# Patient Record
Sex: FEMALE | Race: WHITE | Employment: FULL TIME | URBAN - METROPOLITAN AREA
[De-identification: names, ages, dates, MRNs, and addresses within clinical notes are randomized per-mention and may not be internally consistent; named-entity substitution may affect disease eponyms.]

---

## 2020-09-29 ENCOUNTER — HOSPITAL ENCOUNTER (OUTPATIENT)
Age: 25
Setting detail: SPECIMEN
Discharge: HOME | End: 2020-09-29
Payer: COMMERCIAL

## 2020-09-29 ENCOUNTER — OFFICE VISIT (OUTPATIENT)
Dept: OBGYN CLINIC | Age: 25
End: 2020-09-29
Payer: COMMERCIAL

## 2020-09-29 ENCOUNTER — TELEPHONE (OUTPATIENT)
Dept: OBGYN CLINIC | Age: 25
End: 2020-09-29

## 2020-09-29 VITALS
WEIGHT: 130 LBS | DIASTOLIC BLOOD PRESSURE: 74 MMHG | SYSTOLIC BLOOD PRESSURE: 118 MMHG | HEIGHT: 63 IN | BODY MASS INDEX: 23.04 KG/M2

## 2020-09-29 PROCEDURE — 99395 PREV VISIT EST AGE 18-39: CPT | Performed by: NURSE PRACTITIONER

## 2020-09-29 RX ORDER — LEVONORGESTREL AND ETHINYL ESTRADIOL 0.1-0.02MG
1 KIT ORAL DAILY
COMMUNITY
End: 2021-03-17

## 2020-09-29 RX ORDER — CITALOPRAM 10 MG/1
TABLET ORAL
COMMUNITY
Start: 2020-08-27 | End: 2021-03-17

## 2020-09-29 SDOH — HEALTH STABILITY: MENTAL HEALTH: HOW OFTEN DO YOU HAVE A DRINK CONTAINING ALCOHOL?: 2-4 TIMES A MONTH

## 2020-09-29 NOTE — PROGRESS NOTES
YEARLY PHYSICAL    Date of service: 2020    Osiel Naidu  Is a 31 y.o.  female    PT's PCP is: [id number] [facility]     : 1995                                             Subjective:       Patient's last menstrual period was 2020. Are your menses regular: yes    OB History    Para Term  AB Living   0 0 0 0 0 0   SAB TAB Ectopic Molar Multiple Live Births   0 0 0 0 0 0        Social History     Tobacco Use   Smoking Status Never Smoker   Smokeless Tobacco Never Used        Social History     Substance and Sexual Activity   Alcohol Use Yes    Frequency: 2-4 times a month    Comment: social, wine       Family History   Problem Relation Age of Onset    Rheum Arthritis Mother     Rheum Arthritis Maternal Grandmother        Any family history of breast or ovarian cancer: No    Any family history of blood clots: No      Allergies: Patient has no known allergies. Current Outpatient Medications:     citalopram (CELEXA) 10 MG tablet, TAKE 1 TABLET BY MOUTH ONCE DAILY, Disp: , Rfl:     levonorgestrel-ethinyl estradiol (VIENVA) 0.1-20 MG-MCG per tablet, Take 1 tablet by mouth daily, Disp: , Rfl:     Social History     Substance and Sexual Activity   Sexual Activity Yes    Partners: Male    Birth control/protection: Pill       Last Yearly:  2017    Last pap: 2016    Last HPV: N/A    Do you do self breast exams: Yes    Past Medical History:   Diagnosis Date    Infertility, female     Syncope        Past Surgical History:   Procedure Laterality Date    LYMPH NODE BIOPSY      lymph node gland removal    NASAL FRACTURE SURGERY         Family History   Problem Relation Age of Onset    Rheum Arthritis Mother     Rheum Arthritis Maternal Grandmother        Chief Complaint   Patient presents with    Gynecologic Exam     Due for pap. cramping with menses. Discuss bc options. Forgets to take OCPs.            PE: Vital Signs  Blood pressure 118/74, height 5' 3\" (1.6 m), weight 130 lb (59 kg), last menstrual period 09/20/2020. Estimated body mass index is 23.03 kg/m² as calculated from the following:    Height as of this encounter: 5' 3\" (1.6 m). Weight as of this encounter: 130 lb (59 kg). HPI: Patient presents today for annual exam. Feeling well. Denies breast/pelvic pain. Pap is due today, desires STD screening. Currently taking OC's and not satisfied due to forgetting to take them daily. Would like to discuss alternative options. Review of Systems   Genitourinary: Positive for menstrual problem (cramping ). All other systems reviewed and are negative. Objective  Lymphatic:   no lymphadenopathy  Heent:   negative   Cor: regular rate and rhythm, no murmurs              Pul:clear to auscultation bilaterally- no wheezes, rales or rhonchi, normal air movement, no respiratory distress      GI: Abdomen soft, non-tender. BS normal. No masses,  No organomegaly           Extremities: normal strength, tone, and muscle mass   Breasts: Breast:normal appearance, no masses or tenderness, No nipple retraction or dimpling, No nipple discharge or bleeding   Pelvic Exam: GENITAL/URINARY:  External Genitalia:  General appearance; normal, Hair distribution; normal, Lesions absent  Urethral Meatus:  Size normal, Location normal, Lesions absent, Prolapse absent  Urethra: Fullness absent, Masses absent  Bladder:  Fullness absent, Masses absent, Tenderness absent, Cystocele absent  Vagina:  General appearance normal, Estrogen effect normal, Discharge absent, Lesions absent, Pelvic support normal  Cervix:  General appearance normal, Lesions absent, Discharge absent, Tenderness absent, Enlargement absent, Nodularity absent  Uterus:  Size normal, Tenderness absent  Adenexa:   Masses absent, Tenderness absent  Anus/Perineum:  Lesions absent and Masses absent                                    Vaginal discharge: no vaginal discharge                             Assessment and Plan          Diagnosis Orders   1. Women's annual routine gynecological examination  PAP SMEAR    C.trachomatis N.gonorrhoeae DNA, Thin Prep   2. Dysmenorrhea         Discussed options available for hormonal cycle control including OC's, Depo, Nexplanon, patch, NuvaRing, IUD. Denies risk factor for use of any method. Patient would like to proceed with Nexplanon. Reviewed risks/benefits, procedure, side effects, bleeding patterns, ACHES-pt verbalizes understanding       I have discontinued Peyton Graham's Desogestrel-Ethinyl Estradiol (EMOQUETTE PO). I am also having her maintain her citalopram and levonorgestrel-ethinyl estradiol. Return in about 1 year (around 9/29/2021) for yearly. She was also counseled on her preventative health maintenance recommendations and follow-up. There are no Patient Instructions on file for this visit.     Tyron Corrales,9/29/2020 11:33 AM

## 2020-10-01 NOTE — TELEPHONE ENCOUNTER
[facility]ef[facility]

## 2020-10-02 LAB
CHLAMYDIA BY THIN PREP: NEGATIVE
N. GONORRHOEAE DNA, THIN PREP: NEGATIVE
SPECIMEN DESCRIPTION: NORMAL

## 2020-10-08 LAB — CYTOLOGY REPORT: NORMAL

## 2020-10-15 NOTE — TELEPHONE ENCOUNTER
Voicemail received from pt returning my call. I called her back and explained coverage. Pt. Voiced that she wants to proceed with the order through the specialty pharm.

## 2020-10-15 NOTE — TELEPHONE ENCOUNTER
I called nexplanon to check on status of this. They told me device and insert both covered at 100%. I called pt and l/m informing of this and that the device needs to be ordered through specialty pharm. I will not place the order until she calls back and confirms that she wants to proceed.

## 2020-11-09 NOTE — TELEPHONE ENCOUNTER
Spoke with [facility] to check status of pts nexplanon device. They did not have any insurance info on file for this pt. Insurance info given to pharmacy rep and they will process.

## 2020-11-12 NOTE — TELEPHONE ENCOUNTER
Pt notified that her Nexplanon device is here in the office. Pt to call on cycle day 1 to schedule the insertion for cycle day 5-9. Pt thinks her cycle is supposed to start today so she will call when it has started. She is aware of need for SPT the day prior to insertion. Order in computer.

## 2020-11-17 ENCOUNTER — HOSPITAL ENCOUNTER (OUTPATIENT)
Age: 25
Setting detail: SPECIMEN
Discharge: HOME | End: 2020-11-17
Payer: COMMERCIAL

## 2020-11-17 LAB — HCG QUANTITATIVE: <1 IU/L

## 2020-11-18 ENCOUNTER — OFFICE VISIT (OUTPATIENT)
Dept: OBGYN CLINIC | Age: 25
End: 2020-11-18
Payer: COMMERCIAL

## 2020-11-18 VITALS
SYSTOLIC BLOOD PRESSURE: 100 MMHG | DIASTOLIC BLOOD PRESSURE: 62 MMHG | WEIGHT: 129 LBS | HEIGHT: 63 IN | BODY MASS INDEX: 22.86 KG/M2

## 2020-11-18 PROCEDURE — 11981 INSERTION DRUG DLVR IMPLANT: CPT | Performed by: ADVANCED PRACTICE MIDWIFE

## 2020-11-18 RX ORDER — ETONOGESTREL 68 MG/1
IMPLANT SUBCUTANEOUS
COMMUNITY
Start: 2020-11-18

## 2020-11-18 NOTE — PROGRESS NOTES
31 y.o.  2020      Elisha Mayberry is a 31 y.o. female is requesting to have her an Nexplanon placed. She does not have any other problems today. She is switching from OCP. She is aware that she may have irregular bleeding. We discussed that sometimes women will need additional doses of oral contraceptive pills to control the irregular bleeding. Past Medical History:   Diagnosis Date    Dysmenorrhea     Infertility, female     Syncope          Past Surgical History:   Procedure Laterality Date    LYMPH NODE BIOPSY      lymph node gland removal    NASAL FRACTURE SURGERY         Family History   Problem Relation Age of Onset    Rheum Arthritis Mother     Rheum Arthritis Maternal Grandmother        Social History     Tobacco Use    Smoking status: Never Smoker    Smokeless tobacco: Never Used   Substance Use Topics    Alcohol use: Yes     Frequency: 2-4 times a month     Comment: social, wine    Drug use: No       Current Outpatient Medications on File Prior to Visit   Medication Sig Dispense Refill    NEXPLANON 68 MG implant       citalopram (CELEXA) 10 MG tablet TAKE 1 TABLET BY MOUTH ONCE DAILY      levonorgestrel-ethinyl estradiol (VIENVA) 0.1-20 MG-MCG per tablet Take 1 tablet by mouth daily       No current facility-administered medications on file prior to visit. Allergies as of 2020    (No Known Allergies)         OB History    Para Term  AB Living   0 0 0 0 0 0   SAB TAB Ectopic Molar Multiple Live Births   0 0 0 0 0 0         Blood pressure 100/62, height 5' 3\" (1.6 m), weight 129 lb (58.5 kg), last menstrual period 2020. PROCEDURE:  The patient was positioned comfortably on our procedure table. She was consented earlier in the appointment and the procedure risk and complications were reviewed. She was marked. A sterile prep was completed with betadine x3 swabs and a 1% lidocaine with epinephrine for local anesthetic was utilized, 1 ml. The nexplanon meseret was noted within the applicator. The Nexplanon was inserted per protocol in the left arm with insertion site subdermally at the inner side of the non-dominant upper arm, overlying the triceps muscle about 8-10cm from the medial epicondyle of the humerus and 3-5cm posterior to the sulcus groove between the biceps and triceps muscle. Placement was confirmed by palpating the device by me and the patient. A steri strip was applied. A pressure wrap was applied. The patient tolerated the procedure well. Formal restrictions were discussed in detail. She is to notify our office if any swelling, redness, temperature, or limb restriction or numbness. No baths, Pools or Lakes until Follow up. She may take Tylenol for any pain. Dudley Wong [id number]  Nexplanon Lot # [id number]  Nexplanon Expires 11/28/2022  Nexplanon  [employer]  Device from specialty pharmacy    Assessment:   Diagnosis Orders   1. Dysmenorrhea     2. Encounter for insertion of subdermal contraceptive       There are no active problems to display for this patient. Dicussed when to stop OCP - finish current pack  Plan:  Return if symptoms worsen or fail to improve, for Yearly.

## 2020-12-15 ENCOUNTER — OFFICE VISIT (OUTPATIENT)
Dept: OBGYN CLINIC | Age: 25
End: 2020-12-15
Payer: COMMERCIAL

## 2020-12-15 VITALS
DIASTOLIC BLOOD PRESSURE: 64 MMHG | WEIGHT: 128 LBS | BODY MASS INDEX: 22.68 KG/M2 | HEIGHT: 63 IN | SYSTOLIC BLOOD PRESSURE: 102 MMHG

## 2020-12-15 PROCEDURE — 99213 OFFICE O/P EST LOW 20 MIN: CPT | Performed by: ADVANCED PRACTICE MIDWIFE

## 2020-12-15 ASSESSMENT — ENCOUNTER SYMPTOMS
RESPIRATORY NEGATIVE: 1
GASTROINTESTINAL NEGATIVE: 1

## 2021-03-15 ENCOUNTER — TELEPHONE (OUTPATIENT)
Dept: OBGYN CLINIC | Age: 26
End: 2021-03-15

## 2021-03-15 NOTE — TELEPHONE ENCOUNTER
Schedule her for pelvic USN and f/u. Will perform vaginal cultures to rule out infection and if normal cultures, neg USN can consider additional treatment options.

## 2021-03-15 NOTE — TELEPHONE ENCOUNTER
Patient called stating that she had the Nexplanon placed 11/2020 and was warned that her cycle could be irregular for up to 6 months. She was having normal/lighter cycles since having her Nexplanon placed. Starting 5 weeks ago, she started having daily spotting and it can vary from dark brown to red. She then started a cycle 3 days ago and this is heavier than since she had the Nexplanon placed. She is also noticing some cramping with this bleeding and does not typically have any cramping. Patient is questioning if there is anything to help with this?

## 2021-03-15 NOTE — TELEPHONE ENCOUNTER
Spoke to patient and reviewed Samina's recommendations. She is scheduled for an ultrasound and follow up on 3/17. Patient verbalized understanding, no further questions/concerns voiced.

## 2021-03-17 ENCOUNTER — HOSPITAL ENCOUNTER (OUTPATIENT)
Age: 26
Setting detail: SPECIMEN
Discharge: HOME | End: 2021-03-17

## 2021-03-17 ENCOUNTER — OFFICE VISIT (OUTPATIENT)
Dept: OBGYN CLINIC | Age: 26
End: 2021-03-17
Payer: COMMERCIAL

## 2021-03-17 VITALS
WEIGHT: 123.6 LBS | BODY MASS INDEX: 21.9 KG/M2 | HEIGHT: 63 IN | DIASTOLIC BLOOD PRESSURE: 64 MMHG | SYSTOLIC BLOOD PRESSURE: 100 MMHG

## 2021-03-17 DIAGNOSIS — N93.9 ABNORMAL UTERINE BLEEDING: Primary | ICD-10-CM

## 2021-03-17 DIAGNOSIS — N83.201 CYST OF RIGHT OVARY: ICD-10-CM

## 2021-03-17 DIAGNOSIS — R10.2 PELVIC PAIN: ICD-10-CM

## 2021-03-17 DIAGNOSIS — R53.83 FATIGUE, UNSPECIFIED TYPE: ICD-10-CM

## 2021-03-17 DIAGNOSIS — R55 NEUROGENIC SYNCOPE: ICD-10-CM

## 2021-03-17 DIAGNOSIS — N93.9 ABNORMAL UTERINE BLEEDING: ICD-10-CM

## 2021-03-17 LAB
ABSOLUTE EOS #: 1.28 K/UL (ref 0–0.44)
ABSOLUTE IMMATURE GRANULOCYTE: <0.03 K/UL (ref 0–0.3)
ABSOLUTE LYMPH #: 2.73 K/UL (ref 1.1–3.7)
ABSOLUTE MONO #: 0.66 K/UL (ref 0.1–1.2)
BASOPHILS # BLD: 1 % (ref 0–2)
BASOPHILS ABSOLUTE: 0.07 K/UL (ref 0–0.2)
DIFFERENTIAL TYPE: ABNORMAL
EOSINOPHILS RELATIVE PERCENT: 19 % (ref 1–4)
HCG QUANTITATIVE: <1 IU/L
HCT VFR BLD CALC: 42.1 % (ref 36.3–47.1)
HEMOGLOBIN: 13.4 G/DL (ref 11.9–15.1)
IMMATURE GRANULOCYTES: 0 %
LYMPHOCYTES # BLD: 39 % (ref 24–43)
MCH RBC QN AUTO: 29.6 PG (ref 25.2–33.5)
MCHC RBC AUTO-ENTMCNC: 31.8 G/DL (ref 28.4–34.8)
MCV RBC AUTO: 93.1 FL (ref 82.6–102.9)
MONOCYTES # BLD: 10 % (ref 3–12)
NRBC AUTOMATED: 0 PER 100 WBC
PDW BLD-RTO: 11.9 % (ref 11.8–14.4)
PLATELET # BLD: 391 K/UL (ref 138–453)
PLATELET ESTIMATE: ABNORMAL
PMV BLD AUTO: 10.3 FL (ref 8.1–13.5)
RBC # BLD: 4.52 M/UL (ref 3.95–5.11)
RBC # BLD: ABNORMAL 10*6/UL
SEG NEUTROPHILS: 31 % (ref 36–65)
SEGMENTED NEUTROPHILS ABSOLUTE COUNT: 2.13 K/UL (ref 1.5–8.1)
TSH SERPL DL<=0.05 MIU/L-ACNC: 1.18 MIU/L (ref 0.3–5)
WBC # BLD: 6.9 K/UL (ref 3.5–11.3)
WBC # BLD: ABNORMAL 10*3/UL

## 2021-03-17 PROCEDURE — 36415 COLL VENOUS BLD VENIPUNCTURE: CPT | Performed by: ADVANCED PRACTICE MIDWIFE

## 2021-03-17 PROCEDURE — 99214 OFFICE O/P EST MOD 30 MIN: CPT | Performed by: ADVANCED PRACTICE MIDWIFE

## 2021-03-17 ASSESSMENT — ENCOUNTER SYMPTOMS
DIARRHEA: 0
RESPIRATORY NEGATIVE: 1
CONSTIPATION: 0
ABDOMINAL PAIN: 0
VOMITING: 0
NAUSEA: 0

## 2021-03-17 NOTE — PROGRESS NOTES
PROBLEM VISIT     Date of service: 3/17/2021    Anahi Bell  Is a 31 y.o.  female    PT's PCP is: Kenyetta Acosta DO     : 1995                                          Review of Systems   Constitutional: Positive for fatigue. Negative for chills and fever. HENT: Negative. Respiratory: Negative. Cardiovascular: Negative. Gastrointestinal: Negative for abdominal pain, constipation, diarrhea, nausea and vomiting. Genitourinary: Positive for menstrual problem (AUB - prolonged), pelvic pain and vaginal bleeding. Negative for dysuria, vaginal discharge and vaginal pain. Neurological: Negative. No LMP recorded. OB History    Para Term  AB Living   0 0 0 0 0 0   SAB TAB Ectopic Molar Multiple Live Births   0 0 0 0 0 0        Social History     Tobacco Use   Smoking Status Never Smoker   Smokeless Tobacco Never Used        Social History     Substance and Sexual Activity   Alcohol Use Yes    Frequency: 2-4 times a month    Comment: social, wine       Allergies: Patient has no known allergies. Current Outpatient Medications:     NEXPLANON 68 MG implant, , Disp: , Rfl:     Social History     Substance and Sexual Activity   Sexual Activity Yes    Partners: Male    Birth control/protection: Pill       Chief Complaint   Patient presents with    Vaginal Bleeding     Pt here for USN f/u for AUB. Pt c/o spotting for next 5 weeks that was dark brown and now is red. C/o pelvic pain. Pt has Nexplanon in 2020. Pt c/o being tired for past 2 weeks. Physical Exam  Constitutional:       Appearance: Normal appearance. She is normal weight. Genitourinary:      Pelvic exam was performed with patient in the lithotomy position. Vulva, urethra, uterus and right adnexa normal.      Vaginal bleeding present. No vaginal discharge, erythema or tenderness. No cervical motion tenderness, friability, erythema or polyp.       Uterus is anteverted and regular. Right adnexa not tender. Left adnexa tender. Genitourinary Comments: Non specific tenderness to left ovary - normal on USN   HENT:      Head: Normocephalic. Eyes:      Pupils: Pupils are equal, round, and reactive to light. Neck:      Musculoskeletal: Normal range of motion. Pulmonary:      Effort: Pulmonary effort is normal.   Abdominal:      Palpations: Abdomen is soft. Tenderness: There is no abdominal tenderness. Musculoskeletal: Normal range of motion. Neurological:      Mental Status: She is alert and oriented to person, place, and time. Skin:     General: Skin is warm and dry. Psychiatric:         Behavior: Behavior normal.   Vitals signs and nursing note reviewed. Chaperone present: Declined. Vital Signs  Blood pressure 100/64, height 5' 3\" (1.6 m), weight 123 lb 9.6 oz (56.1 kg). HPI Pt here for evaluation of continued AUB. Reports had irreg spotting and bleeding since Nexplanon insertion but more recently 5 weeks straight in combination with more pelvic pain with menses. Denies nay new sexual partners. Also having fatigue. Denies fever. Results reviewed today:    USN today reviewed with patient. Assessment and Plan          Diagnosis Orders   1. Abnormal uterine bleeding  CBC Auto Differential    TSH with Reflex    VAGINITIS DNA PROBE    C.trachomatis N.gonorrhoeae DNA    HCG, Quantitative, Pregnancy   2. Pelvic pain  CBC Auto Differential    TSH with Reflex    VAGINITIS DNA PROBE    C.trachomatis N.gonorrhoeae DNA    HCG, Quantitative, Pregnancy   3. Fatigue, unspecified type  CBC Auto Differential    TSH with Reflex   4. Cyst of right ovary     5. Neurogenic syncope       Reviewed USN with patient - normal aside from suspected right sided paraovarian cyst and patient not tender in that region whatsoever.   Discussed labs today including HCG although low suspect for pregnancy and if negative cultures, normal labs can consider use of ARIA x4 months to see if menses regulate or even suppress. Discussed if ARIA suppresses or regulates bleeding then after 4 months will stop its use and if returns to irreg bleeding then consider alternative mgmt for cycle control with nexplanon removal.  Discussed if abnormal lab or culture then treatment or f/u will be recommended. Patient is agreeable and is aware we will call if any abnormal results. I am having Peyton MARILIN Nicklas Goldmann maintain her Nexplanon. Return if symptoms worsen or fail to improve, for Yearly. She was also counseled on her preventative health maintenance recommendations and follow-up. There are no Patient Instructions on file for this visit.     Ebony NAVARRO,3/17/2021 12:04 PM                   Electronically signed by CELESTINO Varner CNM

## 2021-03-18 DIAGNOSIS — N94.6 DYSMENORRHEA: ICD-10-CM

## 2021-03-18 DIAGNOSIS — N93.9 ABNORMAL UTERINE BLEEDING: Primary | ICD-10-CM

## 2021-03-18 DIAGNOSIS — R53.83 FATIGUE, UNSPECIFIED TYPE: ICD-10-CM

## 2021-03-18 DIAGNOSIS — R55 NEUROGENIC SYNCOPE: ICD-10-CM

## 2021-03-18 LAB
DIRECT EXAM: ABNORMAL
Lab: ABNORMAL
SPECIMEN DESCRIPTION: ABNORMAL

## 2021-03-18 RX ORDER — METRONIDAZOLE 500 MG/1
500 TABLET ORAL 2 TIMES DAILY
Qty: 14 TABLET | Refills: 0 | Status: SHIPPED | OUTPATIENT
Start: 2021-03-18 | End: 2021-03-25

## 2021-03-18 NOTE — RESULT ENCOUNTER NOTE
Spoke to patient and reviewed her results and recommendations. She will stop in next month to have repeat labs. Patient verbalized understanding, no further questions/concerns voiced.

## 2021-03-18 NOTE — RESULT ENCOUNTER NOTE
Spoke to patient and reviewed her results and recommendations. She is aware the GC/CHL is still pending and we will notify her once we get those results. Rx for Flagyl e-prescribed to [facility] E[facility] Dr. Patient verbalized understanding, no further questions/concerns voiced.

## 2021-03-19 LAB
C TRACH DNA GENITAL QL NAA+PROBE: NEGATIVE
N. GONORRHOEAE DNA: NEGATIVE
SPECIMEN DESCRIPTION: NORMAL

## 2021-03-25 ENCOUNTER — TELEPHONE (OUTPATIENT)
Dept: OBGYN CLINIC | Age: 26
End: 2021-03-25

## 2021-03-25 NOTE — TELEPHONE ENCOUNTER
Patient called stating that she has finished the Flagyl and is feeling better. She is still having enough bleeding that she needs to wear a tampon, but the bleeding is more of a darker color. She states that it was discussed that she could try a ARIA and was not how quickly after the ATB she could try the [address]. Can you please review and give recommendations?

## 2021-03-29 NOTE — TELEPHONE ENCOUNTER
Spoke with pt and she stated her bleeding has stopped once she finished the Flagyl. Pt would like to monitor bleeding for a few weeks and see if it comes back. If it does come back pt will call and have [address] sent in.

## 2021-03-29 NOTE — TELEPHONE ENCOUNTER
Okay to send in [facility] - take 1 tablet daily. Take in continuous fashion skipping placebo pills for first and second pack, third pack take clear through. Dispense 3 packs, no refills. If this does not fully stop the bleeding and still having bleeding more days than not, will remove nexplanon and use alternative.

## 2021-04-20 ENCOUNTER — HOSPITAL ENCOUNTER (OUTPATIENT)
Age: 26
Setting detail: SPECIMEN
Discharge: HOME | End: 2021-04-20
Payer: COMMERCIAL

## 2021-04-20 DIAGNOSIS — N93.9 ABNORMAL UTERINE BLEEDING: Primary | ICD-10-CM

## 2021-04-20 DIAGNOSIS — N93.9 ABNORMAL UTERINE BLEEDING: ICD-10-CM

## 2021-04-20 LAB — HCG QUALITATIVE: NEGATIVE

## 2021-04-20 RX ORDER — NORGESTIMATE AND ETHINYL ESTRADIOL 0.25-0.035
1 KIT ORAL DAILY
Qty: 3 PACKET | Refills: 0 | Status: SHIPPED | OUTPATIENT
Start: 2021-04-20 | End: 2021-10-28 | Stop reason: ALTCHOICE

## 2021-10-04 ENCOUNTER — TELEPHONE (OUTPATIENT)
Dept: OBGYN CLINIC | Age: 26
End: 2021-10-04

## 2021-10-04 NOTE — TELEPHONE ENCOUNTER
Patient called stating that she has the [address] and was having some irregular bleeding earlier this year and was treated with Flagyl and then used Sprintec for 3 months. She finished her Sprintec in July and it helped with the irregular bleeding. She started to have normal cycles with the [address] [place] [address]. She started bleeding again on 9/12 and is still bleeding. Per patient, it is not heavy, but she is needing to wear something every day. The bleeding tends to be red in color and will occ pass some clots. She denies any s/s of an infection, but did not have any symptoms last time either. She denies any cramping. She also gets tested weekly for COVID for work and she tested positive this am, so she is in quarantine. Can you please review and give any recommendations? If something needs called in, she would prefer [facility].

## 2021-10-04 NOTE — TELEPHONE ENCOUNTER
Please call patient back -     Per my last note: Discussed if ARIA suppresses or regulates bleeding then after 4 months will stop its use and if returns to irreg bleeding then consider alternative mgmt for cycle control with nexplanon removal.     Time to proceed with removal of nexplanon and use different method for menses control. Schedule removal please - to be done with me and we will discuss other options same day - could just use ARIA for period of time.   Obviously schedule after her quarantine

## 2021-10-28 ENCOUNTER — OFFICE VISIT (OUTPATIENT)
Dept: OBGYN CLINIC | Age: 26
End: 2021-10-28
Payer: COMMERCIAL

## 2021-10-28 VITALS
BODY MASS INDEX: 23.11 KG/M2 | WEIGHT: 130.4 LBS | DIASTOLIC BLOOD PRESSURE: 72 MMHG | HEIGHT: 63 IN | SYSTOLIC BLOOD PRESSURE: 102 MMHG

## 2021-10-28 DIAGNOSIS — N93.9 ABNORMAL UTERINE BLEEDING: ICD-10-CM

## 2021-10-28 DIAGNOSIS — Z30.46 CHECKING SUBDERMAL CONTRACEPTIVE: ICD-10-CM

## 2021-10-28 DIAGNOSIS — Z01.411 ABNORMAL FEMALE PELVIC EXAM: Primary | ICD-10-CM

## 2021-10-28 PROCEDURE — 99395 PREV VISIT EST AGE 18-39: CPT | Performed by: ADVANCED PRACTICE MIDWIFE

## 2021-10-28 ASSESSMENT — ENCOUNTER SYMPTOMS
DIARRHEA: 0
CONSTIPATION: 0
ABDOMINAL PAIN: 0
RESPIRATORY NEGATIVE: 1
GASTROINTESTINAL NEGATIVE: 1
SHORTNESS OF BREATH: 0

## 2021-10-28 NOTE — PROGRESS NOTES
and bleeding and wondering about miscarriage.  Other     Pt had bronchinits and coivd in a 2 month span and was on lots of antobotics. Labs:    No results found for this visit on 10/28/21. HPI: Denies breast/pelvic concerns aside from AUB. Reports that just prior to LMP had \"really bad bronchitis\" and was on many treatments then mid cycle was dx with COVID. Denies any new partners or possible STI. Nexplanon in place. Pap normal 2020    Review of Systems   Constitutional: Negative. Negative for chills, fatigue and fever. HENT: Negative. Respiratory: Negative. Negative for shortness of breath. Cardiovascular: Negative. Negative for chest pain. Gastrointestinal: Negative. Negative for abdominal pain, constipation and diarrhea. Genitourinary: Positive for menstrual problem. Negative for dysuria, enuresis, frequency, pelvic pain, urgency and vaginal bleeding. Musculoskeletal: Negative. Neurological: Negative. Negative for dizziness, light-headedness and headaches. Psychiatric/Behavioral: Negative. Objective  Blood pressure 102/72, height 5' 3\" (1.6 m), weight 130 lb 6.4 oz (59.1 kg), last menstrual period 09/12/2021. Physical Exam  Constitutional:       Appearance: Normal appearance. She is normal weight. Genitourinary:      Pelvic exam was performed with patient in the lithotomy position. Vulva, urethra, cervix, uterus, right adnexa and left adnexa normal.      Vaginal bleeding present. Uterus is anteverted and regular. HENT:      Head: Normocephalic. Eyes:      Pupils: Pupils are equal, round, and reactive to light. Cardiovascular:      Rate and Rhythm: Normal rate and regular rhythm. Pulses: Normal pulses. Heart sounds: Normal heart sounds. No murmur heard. Pulmonary:      Effort: Pulmonary effort is normal.      Breath sounds: Normal breath sounds. No wheezing.    Chest:      Breasts:         Right: Normal.         Left: Normal. Abdominal:      Palpations: Abdomen is soft. Tenderness: There is no abdominal tenderness. Musculoskeletal:         General: Normal range of motion. Cervical back: Normal range of motion. No muscular tenderness. Neurological:      Mental Status: She is alert and oriented to person, place, and time. Skin:     General: Skin is warm and dry. Psychiatric:         Behavior: Behavior normal.   Vitals and nursing note reviewed. Chaperone present: Declined. Assessment and Plan          Diagnosis Orders   1. Abnormal female pelvic exam     2. Abnormal uterine bleeding     3. Checking subdermal contraceptive          Repeat Annual every 1 year  Cervical Cytology Evaluation begins at 24years old. If Negative Cytology, Follow-up screening per current guidelines. Mammograms every 1year. If 35 yo and last mammogram was negative. Calcium and Vitamin D dosing reviewed. Colonoscopy screening reviewed as well as onset for bone density testing. Birth control and barrier recommendationsdiscussed. STD counseling and prevention reviewed. Gardisil counseling completed for all patients. Routine healthmaintenance per patients PCP. Discussed menses change - possibly related to prolonged meds and recent infections - pt agrees to monitor      I have discontinued Peyton Graham's norgestimate-ethinyl estradiol. I am also having her maintain her Nexplanon. Return in about 1 year (around 10/28/2022) for Yearly. She was also counseled on her preventative health maintenance recommendations and follow-up. There are no Patient Instructions on file for this visit.     CELESTINO Bernstein CNM,10/28/2021 1:50 PM

## 2022-01-11 ENCOUNTER — OFFICE VISIT (OUTPATIENT)
Dept: OBGYN CLINIC | Age: 27
End: 2022-01-11
Payer: COMMERCIAL

## 2022-01-11 ENCOUNTER — HOSPITAL ENCOUNTER (OUTPATIENT)
Age: 27
Setting detail: SPECIMEN
Discharge: HOME | End: 2022-01-11

## 2022-01-11 VITALS — BODY MASS INDEX: 23.91 KG/M2 | SYSTOLIC BLOOD PRESSURE: 110 MMHG | WEIGHT: 135 LBS | DIASTOLIC BLOOD PRESSURE: 72 MMHG

## 2022-01-11 DIAGNOSIS — N89.8 VAGINAL ODOR: Primary | ICD-10-CM

## 2022-01-11 DIAGNOSIS — N89.8 VAGINAL SORE: ICD-10-CM

## 2022-01-11 DIAGNOSIS — N93.9 ABNORMAL UTERINE BLEEDING: ICD-10-CM

## 2022-01-11 DIAGNOSIS — N89.8 VAGINAL ODOR: ICD-10-CM

## 2022-01-11 LAB
CANDIDA SPECIES, DNA PROBE: NEGATIVE
GARDNERELLA VAGINALIS, DNA PROBE: POSITIVE
SOURCE: ABNORMAL
TRICHOMONAS VAGINALIS DNA: NEGATIVE

## 2022-01-11 PROCEDURE — 99213 OFFICE O/P EST LOW 20 MIN: CPT | Performed by: ADVANCED PRACTICE MIDWIFE

## 2022-01-11 PROCEDURE — 36415 COLL VENOUS BLD VENIPUNCTURE: CPT | Performed by: ADVANCED PRACTICE MIDWIFE

## 2022-01-11 RX ORDER — VALACYCLOVIR HYDROCHLORIDE 1 G/1
1000 TABLET, FILM COATED ORAL 2 TIMES DAILY
Qty: 20 TABLET | Refills: 0 | Status: SHIPPED | OUTPATIENT
Start: 2022-01-11 | End: 2022-01-21

## 2022-01-11 RX ORDER — METRONIDAZOLE 500 MG/1
500 TABLET ORAL 2 TIMES DAILY
Qty: 14 TABLET | Refills: 0 | Status: SHIPPED | OUTPATIENT
Start: 2022-01-11 | End: 2022-01-18

## 2022-01-11 ASSESSMENT — ENCOUNTER SYMPTOMS
GASTROINTESTINAL NEGATIVE: 1
SINUS PAIN: 1
RESPIRATORY NEGATIVE: 1

## 2022-01-11 NOTE — PROGRESS NOTES
PROBLEM VISIT     Date of service: 2022    Mitra Espinoza  Is a 28 y.o.  female    PT's PCP is: Jo Ann Davis DO     : 1995                                          Review of Systems   Constitutional: Negative. HENT: Positive for sinus pain (seeing PCP for allergy testing - has done shots for this in the past). Respiratory: Negative. Gastrointestinal: Negative. Genitourinary: Positive for menstrual problem (Bleeding since 20), vaginal bleeding, vaginal discharge (odor present) and vaginal pain (sore to left side \"bottom\" area). Neurological: Negative. Psychiatric/Behavioral: Negative. Patient's last menstrual period was 2021. OB History    Para Term  AB Living   0 0 0 0 0 0   SAB IAB Ectopic Molar Multiple Live Births   0 0 0 0 0 0        Social History     Tobacco Use   Smoking Status Never Smoker   Smokeless Tobacco Never Used        Social History     Substance and Sexual Activity   Alcohol Use Yes    Alcohol/week: 2.0 standard drinks    Types: 2 Glasses of wine per week    Comment: social, wine       Allergies: Patient has no known allergies. Current Outpatient Medications:     metroNIDAZOLE (FLAGYL) 500 MG tablet, Take 1 tablet by mouth 2 times daily for 7 days, Disp: 14 tablet, Rfl: 0    valACYclovir (VALTREX) 1 g tablet, Take 1 tablet by mouth 2 times daily for 10 days, Disp: 20 tablet, Rfl: 0    NEXPLANON 68 MG implant, , Disp: , Rfl:     Social History     Substance and Sexual Activity   Sexual Activity Yes    Partners: Male    Birth control/protection: Implant       Chief Complaint   Patient presents with    Vaginal Discharge     thinks she has BV, has a vaginal sore that just started, vaginal odor.   Has been bleeding since Romina Day, cycle the month before was normal    Menstrual Problem     wants to discuss something to help with her continued bleeding        Physical Exam  Constitutional:       Appearance: Normal appearance. She is normal weight. Genitourinary:      Left Labia: lesions. Vaginal bleeding present. HENT:      Head: Normocephalic. Eyes:      Pupils: Pupils are equal, round, and reactive to light. Pulmonary:      Effort: Pulmonary effort is normal.   Musculoskeletal:         General: Normal range of motion. Cervical back: Normal range of motion. Neurological:      Mental Status: She is alert and oriented to person, place, and time. Skin:     General: Skin is warm and dry. Psychiatric:         Behavior: Behavior normal.   Vitals and nursing note reviewed. Vital Signs  Blood pressure 110/72, weight 135 lb (61.2 kg), last menstrual period 12/25/2021. HPI Here for eval of possible BV. Reports menses since 12/25/2021 - flow is variable and denies dysmenorrhea from this. Also vaginal odor for 2-3 weeks. Declines STI testing. Has been ill recent - had COVID a while back and seeing PCP today for allergy testing. Also has sore on left vaginal area - onset 1 week \"maybe a little better\" but . Had BV in March 2021 and this \"feels similar\". If negative affirm wants to do OCP again to try to suppress bleeding                                               Assessment and Plan          Diagnosis Orders   1. Vaginal odor     2. Vaginal sore  HERPES PROFILE   3. Abnormal uterine bleeding       Discussed hx of BV - will start Flagyl as helped last time with same s/s and affirm obtained. Patient aware that blood does make exam difficult  Discussed if flagyl does not stop the bleeding can consider another short term OCP use but if still does not resolve then consider STI testing or remove nexplanon change methods. Discussed that lesion appears to be HSV - no hx of this but currently immune suppressed and ongoing sinus s/s therefore could be related to initial outbreak.   Discussed that swab of lesion only confirmatory if active drainage and this appears to be slightly

## 2022-01-13 LAB
HERPES SIMPLEX VIRUS 1 IGG: 1.31
HERPES SIMPLEX VIRUS 2 IGG: 1.56
HERPES TYPE 1/2 IGM COMBINED: 1.14

## 2022-01-17 PROBLEM — B00.9 HERPES: Status: ACTIVE | Noted: 2022-01-17

## 2022-01-20 ENCOUNTER — TELEPHONE (OUTPATIENT)
Dept: OBGYN CLINIC | Age: 27
End: 2022-01-20

## 2022-01-20 NOTE — TELEPHONE ENCOUNTER
Pt finished her meds on Monday and was told to call if bleeding still through the week. Still having light bleeding and didn't know if you wanted to try the OCP again.

## 2022-01-24 RX ORDER — NORGESTIMATE AND ETHINYL ESTRADIOL 0.25-0.035
1 KIT ORAL DAILY
Qty: 1 PACKET | Refills: 2 | Status: SHIPPED | OUTPATIENT
Start: 2022-01-24 | End: 2022-01-26 | Stop reason: SDUPTHER

## 2022-01-24 NOTE — TELEPHONE ENCOUNTER
Okay to try sprintec again x 3 months - if bleeding then persists recommend STI screening and if negative then change methods of menses control due to BTB with current method

## 2022-01-26 RX ORDER — NORGESTIMATE AND ETHINYL ESTRADIOL 0.25-0.035
1 KIT ORAL DAILY
Qty: 1 PACKET | Refills: 2 | Status: SHIPPED | OUTPATIENT
Start: 2022-01-26 | End: 2022-11-01 | Stop reason: ALTCHOICE

## 2022-06-29 ENCOUNTER — TELEPHONE (OUTPATIENT)
Dept: OBGYN CLINIC | Age: 27
End: 2022-06-29

## 2022-06-29 RX ORDER — VALACYCLOVIR HYDROCHLORIDE 500 MG/1
500 TABLET, FILM COATED ORAL 2 TIMES DAILY
Qty: 10 TABLET | Refills: 1 | Status: SHIPPED | OUTPATIENT
Start: 2022-06-29 | End: 2022-07-04

## 2022-08-23 ENCOUNTER — TELEPHONE (OUTPATIENT)
Dept: OBGYN CLINIC | Age: 27
End: 2022-08-23

## 2022-08-23 NOTE — TELEPHONE ENCOUNTER
Pt called stating she has had a cycle for 12 days and bloating, which she states is not normal for her. 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 She is a BR pt, ok to wait until 8-30 for appt or in sooner?

## 2022-08-23 NOTE — TELEPHONE ENCOUNTER
Pt states no new partners, changes in diet/exercise, or stress level. Pt c/o cramping and stomach feels off and is queasy. Pt does not have an appt scheduled yet. Would you like her to be seen?

## 2022-08-23 NOTE — TELEPHONE ENCOUNTER
I could see her - no imaging right now - lets rule out infections such as vaginitis or UTI.   Unfortunately I am out the rest of this week and only here one day next week - we can offer her to wait for me to have an opening, see another provider in our office, or could see her PCP

## 2022-08-23 NOTE — TELEPHONE ENCOUNTER
Pt aware and would like to see another provider. She is off the next 2 days and would like to see someone then. Pt scheduled tomorrow with Corry GASTON

## 2022-08-23 NOTE — TELEPHONE ENCOUNTER
Please call patient - any other changes - infection s/s, stress, diet/exercise routine, new partners? Is she having pelvic pain?     May or may not recommend an appt based on above information

## 2022-08-24 ENCOUNTER — HOSPITAL ENCOUNTER (OUTPATIENT)
Age: 27
Setting detail: SPECIMEN
Discharge: HOME | End: 2022-08-24

## 2022-08-24 ENCOUNTER — OFFICE VISIT (OUTPATIENT)
Dept: OBGYN CLINIC | Age: 27
End: 2022-08-24
Payer: COMMERCIAL

## 2022-08-24 VITALS — DIASTOLIC BLOOD PRESSURE: 78 MMHG | BODY MASS INDEX: 23.67 KG/M2 | SYSTOLIC BLOOD PRESSURE: 108 MMHG | WEIGHT: 133.6 LBS

## 2022-08-24 DIAGNOSIS — N93.9 ABNORMAL UTERINE BLEEDING (AUB): Primary | ICD-10-CM

## 2022-08-24 DIAGNOSIS — N93.9 ABNORMAL UTERINE BLEEDING (AUB): ICD-10-CM

## 2022-08-24 PROCEDURE — 99213 OFFICE O/P EST LOW 20 MIN: CPT

## 2022-08-24 RX ORDER — FLUTICASONE PROPIONATE 93 UG/1
SPRAY, METERED NASAL
COMMUNITY
Start: 2022-07-27

## 2022-08-25 RX ORDER — METRONIDAZOLE 500 MG/1
500 TABLET ORAL 2 TIMES DAILY
Qty: 14 TABLET | Refills: 0 | Status: SHIPPED | OUTPATIENT
Start: 2022-08-25 | End: 2022-09-01

## 2022-08-26 ENCOUNTER — TELEPHONE (OUTPATIENT)
Dept: OBGYN CLINIC | Age: 27
End: 2022-08-26

## 2022-08-26 NOTE — PROGRESS NOTES
DATE OF VISIT:  8/26/22    PATIENT NAME:  Jose Luis Velásquez OF BIRTH: 1995    REASON FOR VISIT:    Chief Complaint   Patient presents with    Vaginal Bleeding     Has had constant vag bleeding since her cycle started on Aug 11. Has clotting at times. Some days the bleeding is heavier than others. Denies itching and burning and doesn't think there is an odor present. HISTORY OF PRESENT ILLNESS:  Patient presents with AUB. Started most recent cycle on 08/11 and has had non-stop bleeding since - some days heavy. Denies discharge, vaginal itching, irritation. States this happened once before and she was prescribed short term OCP in addition to her Nexplanon that is placed. Made her aware that we will rule out infectious cause. Because of issues with Nexplanon and OCPs in past, she is interested in discussing surgical management for her cycles. Discussed ablation vs TLH - explained procedures and recovery, risks/benefits, provided pamphlets for patient to take home. Patient will consider both options as well as insurance coverage and is aware that she will receive call to discuss. Patient's last menstrual period was 08/11/2022 (exact date). Vitals:    08/24/22 1348   BP: 108/78   Weight: 133 lb 9.6 oz (60.6 kg)     Body mass index is 23.67 kg/m². No Known Allergies  Current Outpatient Medications   Medication Sig Dispense Refill    NEXPLANON 68 MG implant       metroNIDAZOLE (FLAGYL) 500 MG tablet Take 1 tablet by mouth 2 times daily for 7 days 14 tablet 0    XHANCE 93 MCG/ACT EXHU       norgestimate-ethinyl estradiol (ORTHO-CYCLEN, 28,) 0.25-35 MG-MCG per tablet Take 1 tablet by mouth daily (Patient not taking: Reported on 8/24/2022) 1 packet 2     No current facility-administered medications for this visit.      Social History     Socioeconomic History    Marital status: Single     Spouse name: None    Number of children: None    Years of education: None    Highest education level: None   Tobacco Use    Smoking status: Never    Smokeless tobacco: Never   Vaping Use    Vaping Use: Never used   Substance and Sexual Activity    Alcohol use: Yes     Alcohol/week: 2.0 standard drinks     Types: 2 Glasses of wine per week     Comment: social, wine    Drug use: No    Sexual activity: Yes     Partners: Male     Birth control/protection: Implant       REVIEW OF SYSTEMS:  Review of Systems   Constitutional:  Negative for chills, fatigue and fever. Genitourinary:  Positive for menstrual problem (irregular, BTB) and vaginal bleeding. Negative for dyspareunia, dysuria, flank pain, pelvic pain and vaginal discharge. PHYSICAL EXAM:  /78   Wt 133 lb 9.6 oz (60.6 kg)   LMP 08/11/2022 (Exact Date)   BMI 23.67 kg/m²   Physical Exam  Constitutional:       Appearance: Normal appearance. Genitourinary:      Vulva normal.      Right Labia: No rash, tenderness or lesions. Left Labia: No tenderness, lesions or rash. Vaginal discharge and bleeding present. No vaginal tenderness. No cervical motion tenderness or discharge. HENT:      Head: Normocephalic and atraumatic. Mouth/Throat:      Mouth: Mucous membranes are moist.   Eyes:      Extraocular Movements: Extraocular movements intact. Musculoskeletal:         General: Normal range of motion. Cervical back: Normal range of motion. Neurological:      General: No focal deficit present. Mental Status: She is alert and oriented to person, place, and time. Skin:     General: Skin is warm and dry. Psychiatric:         Mood and Affect: Mood normal.         Behavior: Behavior normal.         Thought Content: Thought content normal.     The patient, Jake Casarez is a 28 y.o. female, was seen with a total time spent of 20 minutes for the visit on this date of service by the E/M provider. The time component had both face to face and non face to face time spent in determining the total time component.   Counseling and education regarding her diagnosis listed below and her options regarding those diagnoses were also included in determining her time component. The patient had her preventative health maintenance recommendations and follow-up reviewed with her at the completion of her visit. ASSESSMENT:  1. Abnormal uterine bleeding (AUB)        PLAN:  Orders Placed This Encounter   Procedures    Vaginitis DNA Probe     Return for possible surgical mgmt of menses.        Electronically signed by Yoanna Castro PA-C on 08/26/22

## 2022-09-12 NOTE — TELEPHONE ENCOUNTER
Patient called back and wanted to proceed with a hysterectomy. 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 She is concerned about insurance as they are covered through [facility]. Let her know that I will confirm her insurance coverage and we will schedule her visits once we confirm this. We have patient penciled in for 1/16/23.

## 2022-11-01 ENCOUNTER — OFFICE VISIT (OUTPATIENT)
Dept: OBGYN CLINIC | Age: 27
End: 2022-11-01
Payer: COMMERCIAL

## 2022-11-01 VITALS
SYSTOLIC BLOOD PRESSURE: 110 MMHG | HEIGHT: 63 IN | DIASTOLIC BLOOD PRESSURE: 80 MMHG | BODY MASS INDEX: 24.06 KG/M2 | WEIGHT: 135.8 LBS

## 2022-11-01 DIAGNOSIS — Z30.46 CHECKING SUBDERMAL CONTRACEPTIVE: ICD-10-CM

## 2022-11-01 DIAGNOSIS — Z12.72 SMEAR, VAGINAL, AS PART OF ROUTINE GYNECOLOGICAL EXAMINATION: Primary | ICD-10-CM

## 2022-11-01 DIAGNOSIS — Z01.419 SMEAR, VAGINAL, AS PART OF ROUTINE GYNECOLOGICAL EXAMINATION: Primary | ICD-10-CM

## 2022-11-01 PROCEDURE — 99395 PREV VISIT EST AGE 18-39: CPT | Performed by: ADVANCED PRACTICE MIDWIFE

## 2022-11-01 ASSESSMENT — ENCOUNTER SYMPTOMS
RESPIRATORY NEGATIVE: 1
ABDOMINAL PAIN: 0
SHORTNESS OF BREATH: 0
CONSTIPATION: 0
DIARRHEA: 0
GASTROINTESTINAL NEGATIVE: 1

## 2022-11-01 NOTE — PROGRESS NOTES
YEARLY PHYSICAL    Date of service: 2022    Jeffery Pelletier  Is a 28 y.o.   female    PT's PCP is: Aron Kimball DO     : 1995                                             Subjective:       Patient's last menstrual period was 2022 (approximate). Are your menses regular: no    OB History    Para Term  AB Living   0 0 0 0 0 0   SAB IAB Ectopic Molar Multiple Live Births   0 0 0 0 0 0        Social History     Tobacco Use   Smoking Status Never   Smokeless Tobacco Never        Social History     Substance and Sexual Activity   Alcohol Use Yes    Alcohol/week: 2.0 standard drinks    Types: 2 Glasses of wine per week    Comment: social, wine       Family History   Problem Relation Age of Onset    Rheum Arthritis Mother     Rheum Arthritis Maternal Grandmother        Any family history of breast or ovarian cancer: No    Any family history of blood clots: No      Allergies: Patient has no known allergies.       Current Outpatient Medications:     norethindrone (AYGESTIN) 5 MG tablet, Take 2 tablets by mouth See Admin Instructions q 1 hour x 6 doses; qid x 2 days; tid x 2 days; bid x 2 days; daily til finished, Disp: 64 tablet, Rfl: 3    XHANCE 93 MCG/ACT EXHU, , Disp: , Rfl:     NEXPLANON 68 MG implant, , Disp: , Rfl:     Social History     Substance and Sexual Activity   Sexual Activity Yes    Partners: Male    Birth control/protection: Implant       Any bleeding or pain with intercourse: No    Last Yearly:      Last pap: 2020 - negative    Do you do self breast exams: Encouraged    Past Medical History:   Diagnosis Date    Dysmenorrhea     Herpes     Infertility, female     Syncope        Past Surgical History:   Procedure Laterality Date    LYMPH NODE BIOPSY      lymph node gland removal    NASAL FRACTURE SURGERY         Family History   Problem Relation Age of Onset    Rheum Arthritis Mother     Rheum Arthritis Maternal Grandmother        Chief Complaint   Patient presents with    Annual Exam     Pap NL 9/29/20. Denies concerns. Labs:    No results found for this visit on 11/01/22. HPI:  Annual.  Denies breast/pelvic concerns. Continues to have AUB - taking aygestin with nexplanon in place currently. Nexplanon in upper arm. Has hyst scheduled. Declines STI screening, monogamous relationship. Pap normal 2020    Review of Systems   Constitutional: Negative. Negative for chills, fatigue and fever. HENT: Negative. Respiratory: Negative. Negative for shortness of breath. Cardiovascular: Negative. Negative for chest pain. Gastrointestinal: Negative. Negative for abdominal pain, constipation and diarrhea. Genitourinary:  Negative for dysuria, enuresis, frequency, menstrual problem, pelvic pain, urgency and vaginal bleeding. Musculoskeletal: Negative. Neurological: Negative. Negative for dizziness, light-headedness and headaches. Psychiatric/Behavioral: Negative. Objective  Blood pressure 110/80, height 5' 3\" (1.6 m), weight 135 lb 12.8 oz (61.6 kg), last menstrual period 09/26/2022. Physical Exam  Constitutional:       Appearance: Normal appearance. She is normal weight. Genitourinary:      Vulva, bladder and urethral meatus normal.      No vaginal discharge or tenderness. No vaginal prolapse present. Right Adnexa: not tender. Left Adnexa: not tender. No cervical motion tenderness or discharge. Uterus is not tender. Pelvic exam was performed with patient in the lithotomy position. Breasts:     Breasts are symmetrical.      Breasts are soft. Right: No mass, nipple discharge, skin change or tenderness. Left: No mass, nipple discharge, skin change or tenderness. HENT:      Head: Normocephalic. Eyes:      Pupils: Pupils are equal, round, and reactive to light. Cardiovascular:      Rate and Rhythm: Normal rate and regular rhythm. Pulses: Normal pulses. Heart sounds: Normal heart sounds. No murmur heard. Pulmonary:      Effort: Pulmonary effort is normal.      Breath sounds: Normal breath sounds. No wheezing. Abdominal:      Palpations: Abdomen is soft. Tenderness: There is no abdominal tenderness. Musculoskeletal:         General: Normal range of motion. Cervical back: Normal range of motion. No muscular tenderness. Comments: Nexplanon palpated in left upper arm   Neurological:      Mental Status: She is alert and oriented to person, place, and time. Skin:     General: Skin is warm and dry. Psychiatric:         Behavior: Behavior normal.   Vitals and nursing note reviewed. Chaperone present: Declined. Assessment and Plan          Diagnosis Orders   1. Smear, vaginal, as part of routine gynecological examination        2. Checking subdermal contraceptive            Repeat Annual every 1 year  Cervical Cytology Evaluation begins at 24years old. If Negative Cytology, Follow-up screening per current guidelines. Mammograms every 1year. If 35 yo and last mammogram was negative. Calcium and Vitamin D dosing reviewed. Birth control and barrier recommendationsdiscussed. STD counseling and prevention reviewed. Routine healthmaintenance per patients PCP. Encouraged to schedule pre/post op exams. I have discontinued Peyton Graham's norgestimate-ethinyl estradiol. I am also having her maintain her Nexplanon, Xhance, and norethindrone. Return in about 1 year (around 11/1/2023) for Yearly. She was also counseled on her preventative health maintenance recommendations and follow-up. There are no Patient Instructions on file for this visit.     CELESTINO Liu CNM,11/1/2022 9:36 AM

## 2022-12-14 ENCOUNTER — TELEPHONE (OUTPATIENT)
Dept: OBGYN CLINIC | Age: 27
End: 2022-12-14

## 2022-12-14 RX ORDER — VALACYCLOVIR HYDROCHLORIDE 1 G/1
1000 TABLET, FILM COATED ORAL DAILY
Qty: 5 TABLET | Refills: 0 | Status: SHIPPED | OUTPATIENT
Start: 2022-12-14 | End: 2022-12-19

## 2022-12-14 NOTE — TELEPHONE ENCOUNTER
Pt calling with c/o HSV outbreak and needs refill of valtrex- uses [facility] pharmacy

## 2023-01-11 ENCOUNTER — OFFICE VISIT (OUTPATIENT)
Dept: OBGYN CLINIC | Age: 28
End: 2023-01-11
Payer: COMMERCIAL

## 2023-01-11 ENCOUNTER — HOSPITAL ENCOUNTER (OUTPATIENT)
Age: 28
Setting detail: SPECIMEN
Discharge: HOME | End: 2023-01-11

## 2023-01-11 VITALS — DIASTOLIC BLOOD PRESSURE: 80 MMHG | BODY MASS INDEX: 25.33 KG/M2 | WEIGHT: 143 LBS | SYSTOLIC BLOOD PRESSURE: 128 MMHG

## 2023-01-11 DIAGNOSIS — R10.2 PELVIC PAIN: ICD-10-CM

## 2023-01-11 DIAGNOSIS — Z01.818 PRE-OP TESTING: ICD-10-CM

## 2023-01-11 DIAGNOSIS — N94.6 DYSMENORRHEA: ICD-10-CM

## 2023-01-11 DIAGNOSIS — N93.9 ABNORMAL UTERINE BLEEDING (AUB): ICD-10-CM

## 2023-01-11 DIAGNOSIS — R10.2 PELVIC PAIN: Primary | ICD-10-CM

## 2023-01-11 LAB
ABSOLUTE EOS #: 1.34 K/UL (ref 0–0.44)
ABSOLUTE IMMATURE GRANULOCYTE: 0.03 K/UL (ref 0–0.3)
ABSOLUTE LYMPH #: 3.36 K/UL (ref 1.1–3.7)
ABSOLUTE MONO #: 0.7 K/UL (ref 0.1–1.2)
BASOPHILS # BLD: 1 % (ref 0–2)
BASOPHILS ABSOLUTE: 0.09 K/UL (ref 0–0.2)
EOSINOPHILS RELATIVE PERCENT: 15 % (ref 1–4)
HCG QUALITATIVE: NEGATIVE
HCT VFR BLD CALC: 45.9 % (ref 36.3–47.1)
HEMOGLOBIN: 14.5 G/DL (ref 11.9–15.1)
IMMATURE GRANULOCYTES: 0 %
LYMPHOCYTES # BLD: 36 % (ref 24–43)
MCH RBC QN AUTO: 30 PG (ref 25.2–33.5)
MCHC RBC AUTO-ENTMCNC: 31.6 G/DL (ref 28.4–34.8)
MCV RBC AUTO: 94.8 FL (ref 82.6–102.9)
MONOCYTES # BLD: 8 % (ref 3–12)
NRBC AUTOMATED: 0 PER 100 WBC
PDW BLD-RTO: 12.4 % (ref 11.8–14.4)
PLATELET # BLD: 494 K/UL (ref 138–453)
PMV BLD AUTO: 9.8 FL (ref 8.1–13.5)
RBC # BLD: 4.84 M/UL (ref 3.95–5.11)
SEG NEUTROPHILS: 40 % (ref 36–65)
SEGMENTED NEUTROPHILS ABSOLUTE COUNT: 3.74 K/UL (ref 1.5–8.1)
WBC # BLD: 9.3 K/UL (ref 3.5–11.3)

## 2023-01-11 PROCEDURE — 99213 OFFICE O/P EST LOW 20 MIN: CPT | Performed by: OBSTETRICS & GYNECOLOGY

## 2023-01-11 NOTE — PROGRESS NOTES
DATE OF VISIT:  1/11/23    PATIENT NAME:  Neena Valencia OF BIRTH: 1995    REASON FOR VISIT:    Chief Complaint   Patient presents with    Pre-op Exam     Patient is scheduled on 1- for RA TLH, poss BSO, poss lap colpo. Menorrhagia     Patient reports that periods can be heavy. She has to change a pad about every 3 hours. Currently on Aygestin and nexplanon to suppress periods. When she does have a cycle bleeding is prolonged and has lasted for up to 3 months at a time. HISTORY OF PRESENT ILLNESS:  Pt with hx of menorrhagia with irregular cycles; has tried and failed nexplanon + aygestin; can bleed for up to 3 mos at a time; doesn't desire fertility and desires definitive treatment (had been offered ablation v tlh); disc'd tlh/bl salpingectomy; r/b/a reviewed; restrictions and recovery disc'd      No LMP recorded (lmp unknown). Vitals:    01/11/23 0924   BP: 128/80   Site: Right Upper Arm   Position: Sitting   Weight: 143 lb (64.9 kg)     Body mass index is 25.33 kg/m². No Known Allergies  Current Outpatient Medications   Medication Sig Dispense Refill    norethindrone (AYGESTIN) 5 MG tablet take 2 tablets by mouth EVERY 1 HOUR FOR 6 DOSES THEN FOUR TIMES A DAY FOR 2 DAYS THEN THREE TIMES A DAY FOR 2 DAYS THEN TWO TIMES A DAY FOR 2 DAYS THEN DAILY UNTIL FINISHED 64 tablet 0    XHANCE 93 MCG/ACT EXHU       NEXPLANON 68 MG implant        No current facility-administered medications for this visit. Social History     Socioeconomic History    Marital status: Single     Spouse name: None    Number of children: None    Years of education: None    Highest education level: None   Tobacco Use    Smoking status: Never    Smokeless tobacco: Never   Vaping Use    Vaping Use: Never used   Substance and Sexual Activity    Alcohol use:  Yes     Alcohol/week: 12.0 standard drinks     Types: 12 Cans of beer per week     Comment: social, wine    Drug use: No    Sexual activity: Yes Partners: Male     Birth control/protection: Implant       REVIEW OF SYSTEMS:  Review of Systems   Constitutional:  Negative for chills and fever. Genitourinary:  Positive for menstrual problem. Negative for dysuria and vaginal discharge. PHYSICAL EXAM:  /80 (Site: Right Upper Arm, Position: Sitting)   Wt 143 lb (64.9 kg)   LMP  (LMP Unknown)   BMI 25.33 kg/m²   Physical Exam  Constitutional:       Appearance: Normal appearance. HENT:      Head: Normocephalic and atraumatic. Eyes:      Extraocular Movements: Extraocular movements intact. Pupils: Pupils are equal, round, and reactive to light. Cardiovascular:      Rate and Rhythm: Normal rate. Pulmonary:      Effort: Pulmonary effort is normal.   Musculoskeletal:         General: Normal range of motion. Neurological:      Mental Status: She is alert and oriented to person, place, and time. Skin:     General: Skin is warm and dry. Psychiatric:         Mood and Affect: Mood normal.         Behavior: Behavior normal.     The patient, Kathia Steen is a 33 y.o. female, was seen with a total time spent of 20 minutes for the visit on this date of service by the E/M provider. The time component had both face to face and non face to face time spent in determining the total time component. Counseling and education regarding her diagnosis listed below and her options regarding those diagnoses were also included in determining her time component. Diagnosis Orders   1. Pelvic pain        2. Dysmenorrhea             The patient had her preventative health maintenance recommendations and follow-up reviewed with her at the completion of her visit. ASSESSMENT:      1. Pelvic pain    2. Dysmenorrhea        PLAN:  No orders of the defined types were placed in this encounter. No follow-ups on file.        Electronically signed by Michelle Yoon DO on 01/11/23

## 2023-01-13 ENCOUNTER — ANESTHESIA EVENT (OUTPATIENT)
Dept: OPERATING ROOM | Age: 28
End: 2023-01-13
Payer: COMMERCIAL

## 2023-01-16 ENCOUNTER — ANESTHESIA (OUTPATIENT)
Dept: OPERATING ROOM | Age: 28
End: 2023-01-16
Payer: COMMERCIAL

## 2023-01-16 ENCOUNTER — HOSPITAL ENCOUNTER (OUTPATIENT)
Age: 28
Setting detail: OUTPATIENT SURGERY
Discharge: HOME | End: 2023-01-16
Attending: OBSTETRICS & GYNECOLOGY | Admitting: OBSTETRICS & GYNECOLOGY
Payer: COMMERCIAL

## 2023-01-16 VITALS
HEART RATE: 85 BPM | TEMPERATURE: 98 F | BODY MASS INDEX: 24.91 KG/M2 | OXYGEN SATURATION: 99 % | WEIGHT: 140.6 LBS | DIASTOLIC BLOOD PRESSURE: 83 MMHG | SYSTOLIC BLOOD PRESSURE: 137 MMHG | HEIGHT: 63 IN | RESPIRATION RATE: 16 BRPM

## 2023-01-16 DIAGNOSIS — G89.18 POST-OP PAIN: Primary | ICD-10-CM

## 2023-01-16 DIAGNOSIS — N93.9 ABNORMAL UTERINE BLEEDING: ICD-10-CM

## 2023-01-16 PROCEDURE — 3700000001 HC ADD 15 MINUTES (ANESTHESIA): Performed by: OBSTETRICS & GYNECOLOGY

## 2023-01-16 PROCEDURE — 2709999900 HC NON-CHARGEABLE SUPPLY: Performed by: OBSTETRICS & GYNECOLOGY

## 2023-01-16 PROCEDURE — 6360000002 HC RX W HCPCS

## 2023-01-16 PROCEDURE — 6360000002 HC RX W HCPCS: Performed by: NURSE ANESTHETIST, CERTIFIED REGISTERED

## 2023-01-16 PROCEDURE — 64488 TAP BLOCK BI INJECTION: CPT | Performed by: NURSE ANESTHETIST, CERTIFIED REGISTERED

## 2023-01-16 PROCEDURE — 7100000001 HC PACU RECOVERY - ADDTL 15 MIN: Performed by: OBSTETRICS & GYNECOLOGY

## 2023-01-16 PROCEDURE — 6370000000 HC RX 637 (ALT 250 FOR IP): Performed by: NURSE ANESTHETIST, CERTIFIED REGISTERED

## 2023-01-16 PROCEDURE — 7100000011 HC PHASE II RECOVERY - ADDTL 15 MIN: Performed by: OBSTETRICS & GYNECOLOGY

## 2023-01-16 PROCEDURE — 7100000000 HC PACU RECOVERY - FIRST 15 MIN: Performed by: OBSTETRICS & GYNECOLOGY

## 2023-01-16 PROCEDURE — 3700000000 HC ANESTHESIA ATTENDED CARE: Performed by: OBSTETRICS & GYNECOLOGY

## 2023-01-16 PROCEDURE — 58571 TLH W/T/O 250 G OR LESS: CPT | Performed by: OBSTETRICS & GYNECOLOGY

## 2023-01-16 PROCEDURE — A4216 STERILE WATER/SALINE, 10 ML: HCPCS | Performed by: NURSE ANESTHETIST, CERTIFIED REGISTERED

## 2023-01-16 PROCEDURE — 3600000009 HC SURGERY ROBOT BASE: Performed by: OBSTETRICS & GYNECOLOGY

## 2023-01-16 PROCEDURE — 58571 TLH W/T/O 250 G OR LESS: CPT

## 2023-01-16 PROCEDURE — 3600000019 HC SURGERY ROBOT ADDTL 15MIN: Performed by: OBSTETRICS & GYNECOLOGY

## 2023-01-16 PROCEDURE — 7100000010 HC PHASE II RECOVERY - FIRST 15 MIN: Performed by: OBSTETRICS & GYNECOLOGY

## 2023-01-16 PROCEDURE — 2500000003 HC RX 250 WO HCPCS: Performed by: NURSE ANESTHETIST, CERTIFIED REGISTERED

## 2023-01-16 PROCEDURE — 2580000003 HC RX 258: Performed by: NURSE ANESTHETIST, CERTIFIED REGISTERED

## 2023-01-16 PROCEDURE — S2900 ROBOTIC SURGICAL SYSTEM: HCPCS | Performed by: OBSTETRICS & GYNECOLOGY

## 2023-01-16 PROCEDURE — 88307 TISSUE EXAM BY PATHOLOGIST: CPT

## 2023-01-16 RX ORDER — SODIUM CHLORIDE 0.9 % (FLUSH) 0.9 %
5-40 SYRINGE (ML) INJECTION PRN
Status: DISCONTINUED | OUTPATIENT
Start: 2023-01-16 | End: 2023-01-16 | Stop reason: HOSPADM

## 2023-01-16 RX ORDER — SODIUM CHLORIDE 0.9 % (FLUSH) 0.9 %
5-40 SYRINGE (ML) INJECTION EVERY 12 HOURS SCHEDULED
Status: DISCONTINUED | OUTPATIENT
Start: 2023-01-16 | End: 2023-01-16 | Stop reason: HOSPADM

## 2023-01-16 RX ORDER — FENTANYL CITRATE 50 UG/ML
INJECTION, SOLUTION INTRAMUSCULAR; INTRAVENOUS PRN
Status: DISCONTINUED | OUTPATIENT
Start: 2023-01-16 | End: 2023-01-16 | Stop reason: SDUPTHER

## 2023-01-16 RX ORDER — PROPOFOL 10 MG/ML
INJECTION, EMULSION INTRAVENOUS PRN
Status: DISCONTINUED | OUTPATIENT
Start: 2023-01-16 | End: 2023-01-16 | Stop reason: SDUPTHER

## 2023-01-16 RX ORDER — LIDOCAINE HYDROCHLORIDE 20 MG/ML
INJECTION, SOLUTION EPIDURAL; INFILTRATION; INTRACAUDAL; PERINEURAL PRN
Status: DISCONTINUED | OUTPATIENT
Start: 2023-01-16 | End: 2023-01-16 | Stop reason: SDUPTHER

## 2023-01-16 RX ORDER — ENOXAPARIN SODIUM 100 MG/ML
40 INJECTION SUBCUTANEOUS ONCE
Status: COMPLETED | OUTPATIENT
Start: 2023-01-16 | End: 2023-01-16

## 2023-01-16 RX ORDER — SODIUM CHLORIDE, SODIUM LACTATE, POTASSIUM CHLORIDE, CALCIUM CHLORIDE 600; 310; 30; 20 MG/100ML; MG/100ML; MG/100ML; MG/100ML
INJECTION, SOLUTION INTRAVENOUS CONTINUOUS
Status: DISCONTINUED | OUTPATIENT
Start: 2023-01-16 | End: 2023-01-16 | Stop reason: HOSPADM

## 2023-01-16 RX ORDER — NEOSTIGMINE METHYLSULFATE 1 MG/ML
INJECTION, SOLUTION INTRAVENOUS PRN
Status: DISCONTINUED | OUTPATIENT
Start: 2023-01-16 | End: 2023-01-16 | Stop reason: SDUPTHER

## 2023-01-16 RX ORDER — ROPIVACAINE HYDROCHLORIDE 5 MG/ML
INJECTION, SOLUTION EPIDURAL; INFILTRATION; PERINEURAL PRN
Status: DISCONTINUED | OUTPATIENT
Start: 2023-01-16 | End: 2023-01-16 | Stop reason: SDUPTHER

## 2023-01-16 RX ORDER — FENTANYL CITRATE 50 UG/ML
50 INJECTION, SOLUTION INTRAMUSCULAR; INTRAVENOUS EVERY 5 MIN PRN
Status: DISCONTINUED | OUTPATIENT
Start: 2023-01-16 | End: 2023-01-16 | Stop reason: HOSPADM

## 2023-01-16 RX ORDER — SODIUM CHLORIDE 9 MG/ML
INJECTION, SOLUTION INTRAVENOUS PRN
Status: DISCONTINUED | OUTPATIENT
Start: 2023-01-16 | End: 2023-01-16 | Stop reason: HOSPADM

## 2023-01-16 RX ORDER — HYDROCODONE BITARTRATE AND ACETAMINOPHEN 5; 325 MG/1; MG/1
1 TABLET ORAL EVERY 6 HOURS PRN
Qty: 10 TABLET | Refills: 0 | Status: SHIPPED | OUTPATIENT
Start: 2023-01-16 | End: 2023-01-21

## 2023-01-16 RX ORDER — ONDANSETRON 2 MG/ML
INJECTION INTRAMUSCULAR; INTRAVENOUS PRN
Status: DISCONTINUED | OUTPATIENT
Start: 2023-01-16 | End: 2023-01-16 | Stop reason: SDUPTHER

## 2023-01-16 RX ORDER — GLYCOPYRROLATE 0.2 MG/ML
INJECTION INTRAMUSCULAR; INTRAVENOUS PRN
Status: DISCONTINUED | OUTPATIENT
Start: 2023-01-16 | End: 2023-01-16 | Stop reason: SDUPTHER

## 2023-01-16 RX ORDER — KETOROLAC TROMETHAMINE 10 MG/1
10 TABLET, FILM COATED ORAL EVERY 6 HOURS PRN
Qty: 20 TABLET | Refills: 0 | Status: SHIPPED | OUTPATIENT
Start: 2023-01-16 | End: 2024-01-16

## 2023-01-16 RX ORDER — ROCURONIUM BROMIDE 10 MG/ML
INJECTION, SOLUTION INTRAVENOUS PRN
Status: DISCONTINUED | OUTPATIENT
Start: 2023-01-16 | End: 2023-01-16 | Stop reason: SDUPTHER

## 2023-01-16 RX ORDER — SODIUM CHLORIDE 9 MG/ML
INJECTION INTRAVENOUS PRN
Status: DISCONTINUED | OUTPATIENT
Start: 2023-01-16 | End: 2023-01-16 | Stop reason: SDUPTHER

## 2023-01-16 RX ORDER — KETOROLAC TROMETHAMINE 30 MG/ML
INJECTION, SOLUTION INTRAMUSCULAR; INTRAVENOUS PRN
Status: DISCONTINUED | OUTPATIENT
Start: 2023-01-16 | End: 2023-01-16 | Stop reason: SDUPTHER

## 2023-01-16 RX ORDER — ACETAMINOPHEN 325 MG/1
650 TABLET ORAL ONCE
Status: COMPLETED | OUTPATIENT
Start: 2023-01-16 | End: 2023-01-16

## 2023-01-16 RX ORDER — HYDROCODONE BITARTRATE AND ACETAMINOPHEN 5; 325 MG/1; MG/1
1 TABLET ORAL EVERY 6 HOURS PRN
Status: DISCONTINUED | OUTPATIENT
Start: 2023-01-16 | End: 2023-01-16 | Stop reason: HOSPADM

## 2023-01-16 RX ORDER — DIMENHYDRINATE 50 MG/1
50 TABLET ORAL ONCE
Status: COMPLETED | OUTPATIENT
Start: 2023-01-16 | End: 2023-01-16

## 2023-01-16 RX ORDER — DEXAMETHASONE SODIUM PHOSPHATE 4 MG/ML
INJECTION, SOLUTION INTRA-ARTICULAR; INTRALESIONAL; INTRAMUSCULAR; INTRAVENOUS; SOFT TISSUE PRN
Status: DISCONTINUED | OUTPATIENT
Start: 2023-01-16 | End: 2023-01-16 | Stop reason: SDUPTHER

## 2023-01-16 RX ORDER — DEXAMETHASONE SODIUM PHOSPHATE 10 MG/ML
INJECTION, SOLUTION INTRAMUSCULAR; INTRAVENOUS PRN
Status: DISCONTINUED | OUTPATIENT
Start: 2023-01-16 | End: 2023-01-16 | Stop reason: SDUPTHER

## 2023-01-16 RX ORDER — DIPHENHYDRAMINE HYDROCHLORIDE 50 MG/ML
INJECTION INTRAMUSCULAR; INTRAVENOUS PRN
Status: DISCONTINUED | OUTPATIENT
Start: 2023-01-16 | End: 2023-01-16 | Stop reason: SDUPTHER

## 2023-01-16 RX ORDER — MIDAZOLAM HYDROCHLORIDE 1 MG/ML
INJECTION INTRAMUSCULAR; INTRAVENOUS PRN
Status: DISCONTINUED | OUTPATIENT
Start: 2023-01-16 | End: 2023-01-16 | Stop reason: SDUPTHER

## 2023-01-16 RX ADMIN — GLYCOPYRROLATE 0.6 MG: 0.2 INJECTION INTRAMUSCULAR; INTRAVENOUS at 13:59

## 2023-01-16 RX ADMIN — FENTANYL CITRATE 50 MCG: 50 INJECTION, SOLUTION INTRAMUSCULAR; INTRAVENOUS at 14:35

## 2023-01-16 RX ADMIN — ACETAMINOPHEN 650 MG: 325 TABLET ORAL at 10:08

## 2023-01-16 RX ADMIN — HYDROCODONE BITARTRATE AND ACETAMINOPHEN 1 TABLET: 5; 325 TABLET ORAL at 15:16

## 2023-01-16 RX ADMIN — KETOROLAC TROMETHAMINE 30 MG: 30 INJECTION, SOLUTION INTRAMUSCULAR at 13:59

## 2023-01-16 RX ADMIN — NEOSTIGMINE METHYLSULFATE 3 MG: 1 INJECTION INTRAVENOUS at 13:59

## 2023-01-16 RX ADMIN — ROCURONIUM BROMIDE 20 MG: 50 INJECTION INTRAVENOUS at 13:40

## 2023-01-16 RX ADMIN — DEXAMETHASONE SODIUM PHOSPHATE 10 MG: 10 INJECTION, SOLUTION INTRAMUSCULAR; INTRAVENOUS at 10:45

## 2023-01-16 RX ADMIN — DIMENHYDRINATE 50 MG: 50 TABLET ORAL at 10:08

## 2023-01-16 RX ADMIN — SODIUM CHLORIDE 40 ML: 9 INJECTION INTRAMUSCULAR; INTRAVENOUS; SUBCUTANEOUS at 10:45

## 2023-01-16 RX ADMIN — FENTANYL CITRATE 100 MCG: 50 INJECTION INTRAMUSCULAR; INTRAVENOUS at 10:37

## 2023-01-16 RX ADMIN — DEXAMETHASONE SODIUM PHOSPHATE 4 MG: 4 INJECTION, SOLUTION INTRAMUSCULAR; INTRAVENOUS at 12:42

## 2023-01-16 RX ADMIN — LIDOCAINE HYDROCHLORIDE 100 MG: 20 INJECTION, SOLUTION EPIDURAL; INFILTRATION; INTRACAUDAL; PERINEURAL at 12:42

## 2023-01-16 RX ADMIN — PROPOFOL 150 MG: 10 INJECTION, EMULSION INTRAVENOUS at 12:42

## 2023-01-16 RX ADMIN — DIPHENHYDRAMINE HYDROCHLORIDE 12.5 MG: 50 INJECTION, SOLUTION INTRAMUSCULAR; INTRAVENOUS at 12:52

## 2023-01-16 RX ADMIN — ROPIVACAINE HYDROCHLORIDE 38 ML: 5 INJECTION EPIDURAL; INFILTRATION; PERINEURAL at 10:45

## 2023-01-16 RX ADMIN — SODIUM CHLORIDE, POTASSIUM CHLORIDE, SODIUM LACTATE AND CALCIUM CHLORIDE: 600; 310; 30; 20 INJECTION, SOLUTION INTRAVENOUS at 10:08

## 2023-01-16 RX ADMIN — ROCURONIUM BROMIDE 10 MG: 50 INJECTION INTRAVENOUS at 13:15

## 2023-01-16 RX ADMIN — MIDAZOLAM 2 MG: 1 INJECTION INTRAMUSCULAR; INTRAVENOUS at 10:37

## 2023-01-16 RX ADMIN — ENOXAPARIN SODIUM 40 MG: 100 INJECTION SUBCUTANEOUS at 10:09

## 2023-01-16 RX ADMIN — CEFAZOLIN 2000 MG: 10 INJECTION, POWDER, FOR SOLUTION INTRAVENOUS at 12:34

## 2023-01-16 RX ADMIN — ROCURONIUM BROMIDE 40 MG: 50 INJECTION INTRAVENOUS at 12:42

## 2023-01-16 RX ADMIN — ONDANSETRON 4 MG: 2 INJECTION INTRAMUSCULAR; INTRAVENOUS at 13:59

## 2023-01-16 ASSESSMENT — PAIN DESCRIPTION - ORIENTATION
ORIENTATION: LOWER

## 2023-01-16 ASSESSMENT — PAIN SCALES - GENERAL
PAINLEVEL_OUTOF10: 2
PAINLEVEL_OUTOF10: 2
PAINLEVEL_OUTOF10: 4
PAINLEVEL_OUTOF10: 6
PAINLEVEL_OUTOF10: 6
PAINLEVEL_OUTOF10: 3

## 2023-01-16 ASSESSMENT — PAIN DESCRIPTION - LOCATION
LOCATION: ABDOMEN

## 2023-01-16 ASSESSMENT — PAIN DESCRIPTION - FREQUENCY
FREQUENCY: CONTINUOUS

## 2023-01-16 ASSESSMENT — PAIN DESCRIPTION - PAIN TYPE
TYPE: SURGICAL PAIN

## 2023-01-16 ASSESSMENT — PAIN DESCRIPTION - ONSET: ONSET: AWAKENED FROM SLEEP

## 2023-01-16 ASSESSMENT — PAIN DESCRIPTION - DESCRIPTORS
DESCRIPTORS: SORE
DESCRIPTORS: SHARP
DESCRIPTORS: STABBING
DESCRIPTORS: SORE
DESCRIPTORS: SHARP
DESCRIPTORS: ACHING;SORE

## 2023-01-16 ASSESSMENT — PAIN - FUNCTIONAL ASSESSMENT: PAIN_FUNCTIONAL_ASSESSMENT: NONE - DENIES PAIN

## 2023-01-16 NOTE — PROGRESS NOTES

## 2023-01-16 NOTE — H&P
HISTORY AND PHYSICAL             Date: 1/16/2023        Patient Name: Enriqueta Gaston     YOB: 1995      Age:  33 y.o. Chief Complaint   No chief complaint on file. History Obtained From   patient    History of Present Illness   Pt with hx of menorrhagia with irregular cycles; has tried and failed nexplanon + aygestin; can bleed for up to 3 mos at a time; doesn't desire fertility and desires definitive treatment     Past Medical History     Past Medical History:   Diagnosis Date    Dysmenorrhea     Herpes     Infertility, female     Syncope         Past Surgical History     Past Surgical History:   Procedure Laterality Date    LYMPH NODE BIOPSY      lymph node gland removal    NASAL FRACTURE SURGERY          Medications Prior to Admission     Prior to Admission medications    Medication Sig Start Date End Date Taking? Authorizing Provider   norethindrone (AYGESTIN) 5 MG tablet take 2 tablets by mouth EVERY 1 HOUR FOR 6 DOSES THEN FOUR TIMES A DAY FOR 2 DAYS THEN THREE TIMES A DAY FOR 2 DAYS THEN TWO TIMES A DAY FOR 2 DAYS THEN DAILY UNTIL FINISHED 12/11/22   Enid Lyles PA-C   [place] 93 MCG/ACT [place]  7/27/22   Historical Provider, MD   NEXPLANON 68 MG implant  11/18/20   Historical Provider, MD        Allergies   Patient has no known allergies.     Social History     Social History       Tobacco History       Smoking Status  Never      Smokeless Tobacco Use  Never              Alcohol History       Alcohol Use Status  Yes Drinks/Week  0 Glasses of wine, 12 Cans of beer, 0 Shots of liquor, 0 Drinks containing 0.5 oz of alcohol per week Amount  12.0 standard drinks of alcohol/wk Comment  social, wine              Drug Use       Drug Use Status  No              Sexual Activity       Sexually Active  Yes Partners  Male Birth Control/Protection  Implant                    Family History     Family History   Problem Relation Age of Onset    Rheum Arthritis Mother     Rheum Arthritis Maternal Grandmother     Cancer Maternal Grandmother     Uterine Cancer Maternal Grandmother        Review of Systems   Review of Systems   Constitutional:  Negative for chills and fever. Genitourinary:  Positive for menstrual problem. Negative for dysuria and vaginal discharge. Physical Exam   Ht 5' 3\" (1.6 m)   Wt 140 lb 9.6 oz (63.8 kg)   LMP  (LMP Unknown)   BMI 24.91 kg/m²     Physical Exam  Constitutional:       Appearance: Normal appearance. HENT:      Head: Normocephalic and atraumatic. Eyes:      Extraocular Movements: Extraocular movements intact. Pupils: Pupils are equal, round, and reactive to light. Cardiovascular:      Rate and Rhythm: Normal rate. Pulmonary:      Effort: Pulmonary effort is normal.   Musculoskeletal:         General: Normal range of motion. Cervical back: Normal range of motion. Skin:     General: Skin is warm and dry. Neurological:      Mental Status: She is alert and oriented to person, place, and time. Psychiatric:         Mood and Affect: Mood normal.         Behavior: Behavior normal.         Thought Content: Thought content normal.         Judgment: Judgment normal.       Labs    No results found for this or any previous visit (from the past 24 hour(s)). Imaging/Diagnostics Last 24 Hours   No results found.     Assessment      Menorrhagia with irregular cycle    Plan   RA TLH/poss BSO    Consultations Ordered:  None    Electronically signed by Sanjana Alston DO on 1/16/23 at 10:01 AM EST

## 2023-01-16 NOTE — ANESTHESIA PRE PROCEDURE
Department of Anesthesiology  Preprocedure Note       Name:  Peyton Graham   Age:  28 y.o.  :  1995                                          MRN:  [id number]         Date:  2023      Surgeon: Surgeon(s):  Wendy Andres DO    Procedure: Procedure(s):  HYSTERECTOMY VAGINAL LAPAROSCOPIC ROBOTIC ASSISTED-POSSIBLE BSO, POSSIBLE LAPAROSCOPIC COLPOPEXY    Medications prior to admission:   Prior to Admission medications    Medication Sig Start Date End Date Taking? Authorizing Provider   norethindrone (AYGESTIN) 5 MG tablet take 2 tablets by mouth EVERY 1 HOUR FOR 6 DOSES THEN FOUR TIMES A DAY FOR 2 DAYS THEN THREE TIMES A DAY FOR 2 DAYS THEN TWO TIMES A DAY FOR 2 DAYS THEN DAILY UNTIL FINISHED 22   Shruthi Lofton PA-C   XHANCE 93 MCG/ACT EXHU  22   Historical Provider, MD   NEXPLANON 68 MG implant  20   Historical Provider, MD       Current medications:    Current Facility-Administered Medications   Medication Dose Route Frequency Provider Last Rate Last Admin   • lactated ringers infusion   IntraVENous Continuous CELESTINO Flores - CRNA 100 mL/hr at 23 1008 New Bag at 23 1008   • sodium chloride flush 0.9 % injection 5-40 mL  5-40 mL IntraVENous 2 times per day Shruthi Lofton PA-C       • sodium chloride flush 0.9 % injection 5-40 mL  5-40 mL IntraVENous PRN Shruthi Lofton PA-C       • 0.9 % sodium chloride infusion   IntraVENous PRN Shruthi Lofton PA-C       • ceFAZolin (ANCEF) 2000 mg in dextrose 5 % 100 mL IVPB  2,000 mg IntraVENous On Call to OR Shruthi Lofton PA-C           Allergies:  No Known Allergies    Problem List:    Patient Active Problem List   Diagnosis Code   • Neurogenic syncope R55   • Herpes B00.9       Past Medical History:        Diagnosis Date   • Dysmenorrhea    • Herpes    • Infertility, female    • Syncope        Past Surgical History:        Procedure Laterality Date   • LYMPH NODE BIOPSY      lymph node gland  removal    NASAL FRACTURE SURGERY         Social History:    Social History     Tobacco Use    Smoking status: Never    Smokeless tobacco: Never   Substance Use Topics    Alcohol use: Yes     Alcohol/week: 12.0 standard drinks     Types: 12 Cans of beer per week     Comment: social, wine                                Counseling given: Not Answered      Vital Signs (Current):   Vitals:    01/03/23 1251 01/16/23 0953 01/16/23 1001   BP:   123/77   Pulse:   77   Resp:   12   Temp:   36.8 °C (98.2 °F)   TempSrc:   Temporal   SpO2:   99%   Weight: 130 lb (59 kg) 140 lb 9.6 oz (63.8 kg)    Height: 5' 3\" (1.6 m) 5' 3\" (1.6 m)                                               BP Readings from Last 3 Encounters:   01/16/23 123/77   01/11/23 128/80   11/01/22 110/80       NPO Status: Time of last liquid consumption: 2330                        Time of last solid consumption: 1830                        Date of last liquid consumption: 01/15/23                        Date of last solid food consumption: 01/15/23    BMI:   Wt Readings from Last 3 Encounters:   01/16/23 140 lb 9.6 oz (63.8 kg)   01/11/23 143 lb (64.9 kg)   11/01/22 135 lb 12.8 oz (61.6 kg)     Body mass index is 24.91 kg/m².     CBC:   Lab Results   Component Value Date/Time    WBC 9.3 01/11/2023 10:11 AM    RBC 4.84 01/11/2023 10:11 AM    HGB 14.5 01/11/2023 10:11 AM    HCT 45.9 01/11/2023 10:11 AM    MCV 94.8 01/11/2023 10:11 AM    RDW 12.4 01/11/2023 10:11 AM     01/11/2023 10:11 AM       CMP:   Lab Results   Component Value Date/Time     02/03/2014 09:00 AM    K 4.2 02/03/2014 09:00 AM     02/03/2014 09:00 AM    CO2 23 02/03/2014 09:00 AM    BUN 13 02/03/2014 09:00 AM    CREATININE 0.6 02/03/2014 09:00 AM    LABGLOM >90 02/03/2014 09:00 AM    GLUCOSE 95 02/03/2014 09:00 AM    GLUCOSE 100 02/03/2014 09:00 AM    CALCIUM 9.5 02/03/2014 09:00 AM       POC Tests: No results for input(s): POCGLU, POCNA, POCK, POCCL, POCBUN, POCHEMO, POCHCT in the last 72 hours. Coags: No results found for: PROTIME, INR, APTT    HCG (If Applicable):   Lab Results   Component Value Date    PREGTESTUR NEGATIVE 02/03/2014    HCGQUANT <1 03/17/2021        ABGs: No results found for: PHART, PO2ART, IZR6RMI, IQM4YJX, BEART, C4PCHVHF     Type & Screen (If Applicable):  No results found for: LABABO, LABRH    Drug/Infectious Status (If Applicable):  No results found for: HIV, HEPCAB    COVID-19 Screening (If Applicable): No results found for: COVID19        Anesthesia Evaluation  Patient summary reviewed and Nursing notes reviewed no history of anesthetic complications:   Airway: Mallampati: II  TM distance: <3 FB   Neck ROM: full  Mouth opening: > = 3 FB   Dental:          Pulmonary:Negative Pulmonary ROS and normal exam  breath sounds clear to auscultation                             Cardiovascular:Negative CV ROS  Exercise tolerance: good (>4 METS),           Rhythm: regular  Rate: normal           Beta Blocker:  Not on Beta Blocker         Neuro/Psych:   Negative Neuro/Psych ROS               ROS comment: Neurogenic syncope. Patient reports she used to randomly pass out. She reports that she has not had any issues in over 2 years now. GI/Hepatic/Renal: Neg GI/Hepatic/Renal ROS            Endo/Other: Negative Endo/Other ROS                    Abdominal:             Vascular: negative vascular ROS. Other Findings:           Anesthesia Plan      general     ASA 1       Induction: intravenous. MIPS: Postoperative opioids intended and Prophylactic antiemetics administered. Anesthetic plan and risks discussed with patient. Plan discussed with CRNA.                     CELESTINO Hernandez - JAKOB   1/16/2023

## 2023-01-16 NOTE — ANESTHESIA POSTPROCEDURE EVALUATION
Department of Anesthesiology  Postprocedure Note    Patient: Scout Rutherford  MRN: [id number]  YOB: 1995  Date of evaluation: 1/16/2023      Procedure Summary     Date: 01/16/23 Room / Location: [address]    Anesthesia Start: 0130 Anesthesia Stop: 9605    Procedure: HYSTERECTOMY VAGINAL LAPAROSCOPIC ROBOTIC ASSISTED- BILATERAL SALPINGECTOMY Diagnosis:       Abnormal uterine bleeding      (AUB, PELVIC PAIN)    Surgeons: Marino Sandhu DO Responsible Provider: CELESTINO Zuniga CRNA    Anesthesia Type: general ASA Status: 1          Anesthesia Type: No value filed.     Anthony Phase I: Anthony Score: 10    Anthony Phase II:        Anesthesia Post Evaluation    Patient location during evaluation: PACU  Patient participation: complete - patient participated  Level of consciousness: awake and alert  Airway patency: patent  Nausea & Vomiting: no nausea and no vomiting  Complications: no  Cardiovascular status: hemodynamically stable  Respiratory status: acceptable  Hydration status: euvolemic  Multimodal analgesia pain management approach

## 2023-01-16 NOTE — OP NOTE
Preoperative diagnosis: 1. Menorrhagia with irregular cycles  2. Failed multiple medications  3. Desires no fertility    Postoperative diagnosis: Same    Procedure:  Robotic-assisted Total Laparoscopic Hysterectomy with Bilateral Salpingectomy    Surgeon: Dr. Joe Monique    Asst.: Clarence Laurel PA-C Ria Severin PGY 2    Anesthesia: Gen. Estimated blood loss: 25 mL    Fluids: LR    Urine: 700 mL of clear urine    Condition: Stable    Complications: None    Findings: The patient had an anteverted uterus which sounded to  8 cm in depth. The tubes and ovaries were grossly within normal limits. Bilateral ureteral peristalsis was noted throughout the case and after closure of the vaginal cuff. Specimen removed: Uterus and Bilateral tubes    Operative note: The patient was taken to the operating room where general anesthesia was obtained without difficulty. She was prepped and draped usual sterile fashion in a dorsal lithotomy position. Timeout was performed. A weighted speculum was placed in the patient's vagina and the anterior lip of the cervix was grasped with a single-tooth tenaculum. The cervix was progressively dilated and the uterus was sounded with the findings noted above. A V care uterine manipulator was then placed. A Whitten catheter was placed and left to gravity drainage. At this point attention was turned to the patient's abdomen where a supraumbilical incision was made with a scalpel. An 8 mm skin incision was made. A veress needle was then carefully introduced at a 45° angle while tenting the abdominal wall. Intraabdominal placement was confirmed by use of water-filled syringe and drop in intra-abdominal pressure with insufflation of CO2. Pneumoperitoneum was obtained with 2.5 liters of CO2. An 8 mm robotic port was then placed without difficulty and intra-abdominal placement was confirmed by laparoscopy.   Second and third ports were then placed under direct visualization approximately 4 cm inferior and  5 cm lateral to the first.  8 mm robotic ports were placed in these locations as well.  A fourth and final port was placed lateral to the right. We placed a robotic port in this location.  At this point the patient was placed into steep Trendelenburg position.  The robot was brought into position and docked.  The right arm was loaded with the scissors with monopolar cautery.  The left side was loaded with the fenestrated bipolar.  Attention was then turned to the console portion of the surgery.  The left tube was tented and the mesosalpinx of the broad ligament was serially ligated and transected with the fenestrated bipolar toward the cornua of the uterus.  The ovarian pedicle was then ligated with the fenestrated bipolar and transected with the scissors.    Dissection continued along the broad ligament until the round ligament was ligated and transected.  The anterior uterine serosa was then undermined and taken down to free the bladder from the lower uterine segment and cervix.  This was done working from the left side to the midline.  Attention was then turned to the right cornual region of the uterus and in a similar manner the tube and ovary were ligated and transected.  Dissection again continued along the broad ligament until the round ligament was ligated and transected.  The remainder of the bladder flap was then taken down.  Once the bladder was freed from the lower uterine segment and cervix, the uterine arteries were skeletonized, ligated, and transected bilaterally.  Using the scissors, the uterus was amputated just beneath the cervix using the groove of the V care as a guide.  The uterus and tubes were then withdrawn through the vagina and a sponge stick was placed to maintain pneumoperitoneum.    The vaginal cuff was then closed with V lock suture working from right to left using 2-0 suture.  Copious amounts of irrigation were then used to evaluate the  cuff and pedicles to assure hemostasis. Charma Fuchs was placed over the vaginal cuff for continued hemostasis. Bilateral ureteral peristalsis was again noted. At this point the instruments removed from the abdomen. The skin incisions were closed with 4-0 Monocryl in a subcuticular fashion. The uterus and bilateral tubes were handed off to pathology. The vaginal cuff was then examined with no evidence of bleeding. The Whitten catheter was removed. Sponge, lap, needle, and instrument counts were correct ×2 and the patient was taken to the recovery area in apparently stable condition. Surgical Assistant documentation:    An assistant surgeon was required with this surgery. She was able to provide the necessary visualization, uterine manipulation, and suturing. Having her available allowed this case to be perfomed in a safe and timely manner.

## 2023-01-16 NOTE — ANESTHESIA PROCEDURE NOTES
Peripheral Block    Patient location during procedure: pre-op  Reason for block: post-op pain management and at surgeon's request  Start time: 1/16/2023 10:37 AM  End time: 1/16/2023 10:45 AM  Staffing  Performed: resident/CRNA   Resident/CRNA: CELESTINO Schmidt CRNA  Preanesthetic Checklist  Completed: patient identified, IV checked, site marked, risks and benefits discussed, surgical/procedural consents, equipment checked, pre-op evaluation, timeout performed, anesthesia consent given, oxygen available and monitors applied/VS acknowledged  Peripheral Block   Patient position: supine  Prep: ChloraPrep  Provider prep: mask and sterile gloves  Patient monitoring: continuous pulse ox, IV access and responsive to questions  Block type: TAP and Rectus sheath  Laterality: bilateral  Injection technique: single-shot  Guidance: ultrasound guided  Local infiltration: ropivacaine and decadron (See MAR for details.)  Local infiltration: ropivacaine and decadron (See MAR for details.)    Needle   Needle type:  Other   Needle gauge: 22 G  Needle localization: ultrasound guidance  Needle length: 8 cmOther needle type: pajunk  Assessment   Injection assessment: no paresthesia on injection, local visualized surrounding nerve on ultrasound, negative aspiration for heme and no intravascular symptoms  Paresthesia pain: none  Slow fractionated injection: yes  Hemodynamics: stable  Real-time US image taken/store: yes  Outcomes: uncomplicated and patient tolerated procedure well

## 2023-01-16 NOTE — DISCHARGE INSTRUCTIONS
SAME DAY SURGERY DISCHARGE INSTRUCTIONS    1. Do not drive or operate hazardous machinery for 24 hours. 2.  Do not make important personal or business decisions for 24 hours. 3.  Do not drink alcoholic beverages for 24 hours. 4.  Do not smoke tobacco products for 24 hours. 5.  Patient should not be left alone for 12-24 hours following surgical procedure. 6.  Eat light foods (Jell-O, soups, etc....) and drink plenty of fluids (water, Sprite, etc...) up to 8 glasses per day, as you can tolerate. 7.  If your bandages become soaked with bright red blood, place another dressing pad over your bandages. (DO NOT remove original bandage.)  Call your surgeon for further instructions. A small amount of bright red blood is to be expected. 8.  Wash hands before and after incision care. It is important to practice good personal hygiene during the post op period. 9.  You may remove your dressing the morning following surgery; leave the steri-strips in place, they will fall off on their own. If they have not fallen off in 7-10 days, please remove them. 10.  If no drainage from incisions you may shower. 11.  Limit your activities for 24 hours. Do not engage in heavy work until your surgeon gives you permission. DO NOT lift anything heavier than 10 pounds. You may go up & down stairs and do any activity that can be done comfortably. 12.  Report the following signs or any questions regarding your physical condition to your surgeon immediately:    Excessive swelling of, or around the wound area. Redness or pus-like drainage    Temperature of 100 degrees (F) or above. Excessive pain. If unable to urinate 4 hours after surgery. If bleeding at surgery site continues after 5-10 minutes of pressure. 13.  Pain Control:  Take pain meds as prescribed. You may use over the counter meds like Acetaminophen or Ibuprofen if not part of the meds already prescribed.     While on narcotic pain meds DO NOT drive, operate machinery or make business decisions. 14.  Try to avoid constipation (no bowel movement) by using over the counter Colace once or twice daily and increasing your fluid intake. Please call if no bowel movement after increasing fluid intake, use of Milk of Magnesia, Pericolace (laxative) or Dulcolax suppositories. 15.  No sexual activity, tampons, douches, sitting in hot tubs/saunas or swimming in pools/ponds for 6 weeks or until cleared by your surgeon. 12.  Call your surgeon for any questions regarding your surgery. 17.  Call for an appointment to see TRISHA Wheatley in 2 weeks.    Dr. Annalee Springer -- [place] office      [phone]      [facility] [place] office   [phone]

## 2023-01-18 LAB — SURGICAL PATHOLOGY REPORT: NORMAL

## 2023-01-26 ENCOUNTER — OFFICE VISIT (OUTPATIENT)
Dept: OBGYN CLINIC | Age: 28
End: 2023-01-26
Payer: COMMERCIAL

## 2023-01-26 VITALS — WEIGHT: 137 LBS | BODY MASS INDEX: 24.27 KG/M2 | SYSTOLIC BLOOD PRESSURE: 116 MMHG | DIASTOLIC BLOOD PRESSURE: 82 MMHG

## 2023-01-26 DIAGNOSIS — Z30.46 ENCOUNTER FOR REMOVAL OF SUBDERMAL CONTRACEPTIVE IMPLANT: ICD-10-CM

## 2023-01-26 DIAGNOSIS — Z09 POSTOPERATIVE EXAMINATION: Primary | ICD-10-CM

## 2023-01-26 PROCEDURE — 11982 REMOVE DRUG IMPLANT DEVICE: CPT

## 2023-01-26 PROCEDURE — 99999 PR OFFICE/OUTPT VISIT,PROCEDURE ONLY: CPT

## 2023-01-26 PROCEDURE — 99024 POSTOP FOLLOW-UP VISIT: CPT

## 2023-01-26 NOTE — PROGRESS NOTES
Postop Progress Note    Subjective    Kary Israel presents to the office for postop follow up. Chief Complaint   Patient presents with    Post-Op Check     Patient had [address] on 1-. Patient reports occasional cramping but it doesn't last very long. Is doing very well. Procedure     Patient here for nexplanon removal.        Objective    Vitals:    01/26/23 0959   BP: 116/82     General: alert, cooperative and no distress  Incision: healing well    Assessment  Doing well postoperatively. Denies bleeding and discharge. Has occasional cramping. Desires return to work for light duty on Monday. Doing well with restrictions. PROCEDURE:  Steps of the procedure were explained to the patient and verbal consent was obtained. She was positioned comfortably on our procedure table with left upper arm held in right angle position. A sterile prep with betadine x3 swabs was completed and 1ml of lidocaine with epinephrine for local anesthetic was utilized. The skin had a small incision just beyond the proximal tip of the insert and utilizing blunt dissection the stylet was grasped and removed. Device was intact and visualized by patient and I.  A sterile dressing and steri-strips were applied. The patient tolerated the procedure well. Formal restrictions were discussed in detail. She is to notify our office if any swelling, redness, temperature, or limb restriction or numbness. No baths, Pools or Lakes until Follow up. Showers are allowed in 24 hours. She may take Tylenol for any pain. Plan  1. Continue any current medications  2. Wound care discussed  3. Pt is to continue with activity restrictions   4. Usual diet  5.  Follow up: 4 weeks     Electronically signed by Mika Owens PA-C on 1/26/2023 at 11:30 AM

## 2023-02-22 ENCOUNTER — HOSPITAL ENCOUNTER (OUTPATIENT)
Age: 28
Setting detail: SPECIMEN
Discharge: HOME | End: 2023-02-22

## 2023-02-22 ENCOUNTER — OFFICE VISIT (OUTPATIENT)
Dept: OBGYN CLINIC | Age: 28
End: 2023-02-22

## 2023-02-22 VITALS — BODY MASS INDEX: 24.45 KG/M2 | WEIGHT: 138 LBS | DIASTOLIC BLOOD PRESSURE: 68 MMHG | SYSTOLIC BLOOD PRESSURE: 116 MMHG

## 2023-02-22 DIAGNOSIS — N89.8 VAGINAL DISCHARGE: Primary | ICD-10-CM

## 2023-02-22 DIAGNOSIS — N89.8 VAGINAL DISCHARGE: ICD-10-CM

## 2023-02-22 DIAGNOSIS — Z09 POSTOPERATIVE EXAMINATION: ICD-10-CM

## 2023-02-22 PROCEDURE — 99024 POSTOP FOLLOW-UP VISIT: CPT | Performed by: OBSTETRICS & GYNECOLOGY

## 2023-02-22 NOTE — PROGRESS NOTES
Postop Progress Note    Subjective    Rolm Mcburney presents to the office for postop follow up. Chief Complaint   Patient presents with    Post-Op Check     Patient had [address] on 1-. Objective    Vitals:    02/22/23 0745   BP: 116/68     General: alert, cooperative and no distress  Incision: healing well; yellowish discharge; well supported and intact    Assessment  Doing well postoperatively. Plan  1. Continue any current medications  2. Wound care discussed  3. Pt is to increase activities as tolerated  4. Usual diet  5.  Follow up: as needed    Electronically signed by Hedy Todd DO on 2/22/2023 at 7:53 AM

## 2023-02-23 RX ORDER — METRONIDAZOLE 500 MG/1
500 TABLET ORAL 2 TIMES DAILY
Qty: 14 TABLET | Refills: 0 | Status: SHIPPED | OUTPATIENT
Start: 2023-02-23 | End: 2023-03-02

## 2023-03-14 ENCOUNTER — TELEPHONE (OUTPATIENT)
Dept: OBGYN CLINIC | Age: 28
End: 2023-03-14

## 2023-03-14 RX ORDER — VALACYCLOVIR HYDROCHLORIDE 1 G/1
1000 TABLET, FILM COATED ORAL DAILY
Qty: 5 TABLET | Refills: 3 | Status: SHIPPED | OUTPATIENT
Start: 2023-03-14 | End: 2023-03-19

## 2023-11-06 ENCOUNTER — OFFICE VISIT (OUTPATIENT)
Dept: OBGYN CLINIC | Age: 28
End: 2023-11-06
Payer: COMMERCIAL

## 2023-11-06 VITALS
DIASTOLIC BLOOD PRESSURE: 70 MMHG | BODY MASS INDEX: 23.25 KG/M2 | HEIGHT: 63 IN | WEIGHT: 131.2 LBS | SYSTOLIC BLOOD PRESSURE: 110 MMHG

## 2023-11-06 DIAGNOSIS — Z12.72 SMEAR, VAGINAL, AS PART OF ROUTINE GYNECOLOGICAL EXAMINATION: Primary | ICD-10-CM

## 2023-11-06 DIAGNOSIS — Z01.419 SMEAR, VAGINAL, AS PART OF ROUTINE GYNECOLOGICAL EXAMINATION: Primary | ICD-10-CM

## 2023-11-06 PROCEDURE — 99395 PREV VISIT EST AGE 18-39: CPT | Performed by: ADVANCED PRACTICE MIDWIFE

## 2023-11-06 ASSESSMENT — ENCOUNTER SYMPTOMS
SHORTNESS OF BREATH: 0
CONSTIPATION: 0
RESPIRATORY NEGATIVE: 1
GASTROINTESTINAL NEGATIVE: 1
DIARRHEA: 0
ABDOMINAL PAIN: 0

## 2023-11-06 NOTE — PROGRESS NOTES
Chaperone for Intimate Exam  Chaperone was offered as part of the rooming process. Patient declined and agrees to continue with exam without a chaperone.
Rheum Arthritis Mother     Rheum Arthritis Maternal Grandmother     Cancer Maternal Grandmother     Uterine Cancer Maternal Grandmother        Chief Complaint   Patient presents with    Annual Exam     Hysterectomy 1/2023. Denies concerns. Labs:    No results found for this visit on 11/06/23. HPI:  Annual.  Denies breast/pelvic concerns. Monogamous relationship. Pap was normal in 2020 and now s/p hyst with no hx of CIN2+ therefore no further pap smears necessary. Review of Systems   Constitutional: Negative. Negative for chills, fatigue and fever. HENT: Negative. Respiratory: Negative. Negative for shortness of breath. Cardiovascular: Negative. Negative for chest pain. Gastrointestinal: Negative. Negative for abdominal pain, constipation and diarrhea. Genitourinary:  Negative for dysuria, enuresis, frequency, menstrual problem, pelvic pain, urgency and vaginal bleeding. Musculoskeletal: Negative. Neurological: Negative. Negative for dizziness, light-headedness and headaches. Psychiatric/Behavioral: Negative. Objective  Blood pressure 110/70, height 1.6 m (5' 3\"), weight 59.5 kg (131 lb 3.2 oz). Physical Exam  Constitutional:       Appearance: Normal appearance. She is normal weight. Genitourinary:      Vulva, bladder and urethral meatus normal.      No vaginal discharge or tenderness. No vaginal prolapse present. Right Adnexa: not tender. Left Adnexa: not tender. Cervix is absent. Uterus is absent. Pelvic exam was performed with patient in the lithotomy position. Breasts:     Breasts are symmetrical.      Breasts are soft. Right: No mass, nipple discharge, skin change or tenderness. Left: No mass, nipple discharge, skin change or tenderness. HENT:      Head: Normocephalic. Eyes:      Pupils: Pupils are equal, round, and reactive to light. Cardiovascular:      Rate and Rhythm: Normal rate and regular rhythm.

## 2024-11-04 ENCOUNTER — OFFICE VISIT (OUTPATIENT)
Dept: OBGYN CLINIC | Age: 29
End: 2024-11-04
Payer: COMMERCIAL

## 2024-11-04 ENCOUNTER — HOSPITAL ENCOUNTER (OUTPATIENT)
Age: 29
Setting detail: SPECIMEN
Discharge: HOME | End: 2024-11-04

## 2024-11-04 VITALS
SYSTOLIC BLOOD PRESSURE: 112 MMHG | DIASTOLIC BLOOD PRESSURE: 60 MMHG | HEIGHT: 63 IN | BODY MASS INDEX: 24.8 KG/M2 | WEIGHT: 140 LBS

## 2024-11-04 DIAGNOSIS — N89.8 VAGINAL DISCHARGE: Primary | ICD-10-CM

## 2024-11-04 DIAGNOSIS — N89.8 VAGINAL DISCHARGE: ICD-10-CM

## 2024-11-04 DIAGNOSIS — N89.8 VAGINAL ITCHING: ICD-10-CM

## 2024-11-04 LAB
CANDIDA SPECIES: POSITIVE
GARDNERELLA VAGINALIS: NEGATIVE
SOURCE: ABNORMAL
TRICHOMONAS: NEGATIVE

## 2024-11-04 PROCEDURE — 99214 OFFICE O/P EST MOD 30 MIN: CPT | Performed by: ADVANCED PRACTICE MIDWIFE

## 2024-11-04 RX ORDER — METOPROLOL SUCCINATE 25 MG/1
25 TABLET, EXTENDED RELEASE ORAL DAILY
COMMUNITY

## 2024-11-04 RX ORDER — METRONIDAZOLE 7.5 MG/G
1 GEL VAGINAL DAILY
Qty: 1 EACH | Refills: 0 | Status: SHIPPED | OUTPATIENT
Start: 2024-11-04 | End: 2024-11-09

## 2024-11-04 RX ORDER — FLUCONAZOLE 150 MG/1
TABLET ORAL
Qty: 3 TABLET | Refills: 0 | Status: SHIPPED | OUTPATIENT
Start: 2024-11-04

## 2024-11-04 ASSESSMENT — ENCOUNTER SYMPTOMS
GASTROINTESTINAL NEGATIVE: 1
RESPIRATORY NEGATIVE: 1

## 2024-11-04 NOTE — PROGRESS NOTES
absent.      Uterus is absent.   HENT:      Head: Normocephalic.   Eyes:      Pupils: Pupils are equal, round, and reactive to light.   Pulmonary:      Effort: Pulmonary effort is normal.   Musculoskeletal:         General: Normal range of motion.      Cervical back: Normal range of motion.   Neurological:      Mental Status: She is alert and oriented to person, place, and time.   Skin:     General: Skin is warm and dry.   Psychiatric:         Behavior: Behavior normal.   Vitals and nursing note reviewed.         Vital Signs  Blood pressure 112/60, height 1.6 m (5' 3\"), weight 63.5 kg (140 lb).                                 Assessment and Plan          Diagnosis Orders   1. Vaginal discharge  Vaginitis DNA Probe      2. Vaginal itching  Vaginitis DNA Probe          Reviewed findings  Suggested treatment for yeast and BV  Discussed flagyl vs metrogel - desires metrogel due to upcoming vacation  Keep next visit as scheduled  Declined gc/ct screening      I am having Peyton Graham start on fluconazole and metroNIDAZOLE. I am also having her maintain her metoprolol succinate.    No follow-ups on file.    She was also counseled on her preventative health maintenance recommendations and follow-up.     There are no Patient Instructions on file for this visit.    CELESTINO VILLA CNM,11/4/2024 1:30 PM                     Electronically signed by CELESTINO VILLA CNM

## 2024-11-14 ENCOUNTER — OFFICE VISIT (OUTPATIENT)
Dept: OBGYN CLINIC | Age: 29
End: 2024-11-14
Payer: COMMERCIAL

## 2024-11-14 ENCOUNTER — TELEPHONE (OUTPATIENT)
Dept: OBGYN CLINIC | Age: 29
End: 2024-11-14

## 2024-11-14 VITALS
DIASTOLIC BLOOD PRESSURE: 60 MMHG | BODY MASS INDEX: 24.98 KG/M2 | SYSTOLIC BLOOD PRESSURE: 96 MMHG | HEIGHT: 63 IN | WEIGHT: 141 LBS

## 2024-11-14 DIAGNOSIS — Z01.411 ENCOUNTER FOR GYNECOLOGICAL EXAMINATION WITH ABNORMAL FINDING: Primary | ICD-10-CM

## 2024-11-14 DIAGNOSIS — R53.83 OTHER FATIGUE: ICD-10-CM

## 2024-11-14 DIAGNOSIS — R63.5 WEIGHT GAIN: ICD-10-CM

## 2024-11-14 DIAGNOSIS — L67.9 HAIR CHANGES: ICD-10-CM

## 2024-11-14 DIAGNOSIS — Z13.21 ENCOUNTER FOR VITAMIN DEFICIENCY SCREENING: ICD-10-CM

## 2024-11-14 DIAGNOSIS — Z13.29 SCREENING FOR THYROID DISORDER: ICD-10-CM

## 2024-11-14 DIAGNOSIS — R00.0 TACHYCARDIA: ICD-10-CM

## 2024-11-14 DIAGNOSIS — Z13.89 ENCOUNTER FOR SCREENING FOR OTHER DISORDER: ICD-10-CM

## 2024-11-14 DIAGNOSIS — Z13.220 SCREENING FOR LIPID DISORDERS: ICD-10-CM

## 2024-11-14 DIAGNOSIS — R13.10 DYSPHAGIA, UNSPECIFIED TYPE: ICD-10-CM

## 2024-11-14 DIAGNOSIS — R23.3 BRUISING TENDENCY: ICD-10-CM

## 2024-11-14 PROCEDURE — 99213 OFFICE O/P EST LOW 20 MIN: CPT | Performed by: ADVANCED PRACTICE MIDWIFE

## 2024-11-14 PROCEDURE — 99395 PREV VISIT EST AGE 18-39: CPT | Performed by: ADVANCED PRACTICE MIDWIFE

## 2024-11-14 SDOH — ECONOMIC STABILITY: INCOME INSECURITY: IN THE LAST 12 MONTHS, WAS THERE A TIME WHEN YOU WERE NOT ABLE TO PAY THE MORTGAGE OR RENT ON TIME?: NO

## 2024-11-14 SDOH — ECONOMIC STABILITY: FOOD INSECURITY: WITHIN THE PAST 12 MONTHS, YOU WORRIED THAT YOUR FOOD WOULD RUN OUT BEFORE YOU GOT MONEY TO BUY MORE.: NEVER TRUE

## 2024-11-14 SDOH — ECONOMIC STABILITY: FOOD INSECURITY: WITHIN THE PAST 12 MONTHS, THE FOOD YOU BOUGHT JUST DIDN'T LAST AND YOU DIDN'T HAVE MONEY TO GET MORE.: NEVER TRUE

## 2024-11-14 SDOH — ECONOMIC STABILITY: TRANSPORTATION INSECURITY
IN THE PAST 12 MONTHS, HAS THE LACK OF TRANSPORTATION KEPT YOU FROM MEDICAL APPOINTMENTS OR FROM GETTING MEDICATIONS?: NO

## 2024-11-14 SDOH — ECONOMIC STABILITY: TRANSPORTATION INSECURITY
IN THE PAST 12 MONTHS, HAS LACK OF TRANSPORTATION KEPT YOU FROM MEETINGS, WORK, OR FROM GETTING THINGS NEEDED FOR DAILY LIVING?: NO

## 2024-11-14 ASSESSMENT — ENCOUNTER SYMPTOMS
ABDOMINAL PAIN: 0
RESPIRATORY NEGATIVE: 1
CONSTIPATION: 0
DIARRHEA: 0
GASTROINTESTINAL NEGATIVE: 1
SHORTNESS OF BREATH: 0
TROUBLE SWALLOWING: 1

## 2024-11-14 ASSESSMENT — SOCIAL DETERMINANTS OF HEALTH (SDOH): HOW HARD IS IT FOR YOU TO PAY FOR THE VERY BASICS LIKE FOOD, HOUSING, MEDICAL CARE, AND HEATING?: NOT HARD AT ALL

## 2024-11-14 NOTE — TELEPHONE ENCOUNTER
Please call patient - would mattie all labs in chart to be done  Las set was done 5/2024 - appropriate to repeat  Needs to be fasting

## 2024-11-18 ENCOUNTER — TELEPHONE (OUTPATIENT)
Dept: OBGYN CLINIC | Age: 29
End: 2024-11-18

## 2024-11-18 DIAGNOSIS — R23.3 BRUISING TENDENCY: ICD-10-CM

## 2024-11-18 DIAGNOSIS — Z13.220 SCREENING FOR LIPID DISORDERS: ICD-10-CM

## 2024-11-18 DIAGNOSIS — L67.9 HAIR CHANGES: ICD-10-CM

## 2024-11-18 DIAGNOSIS — Z13.29 SCREENING FOR THYROID DISORDER: ICD-10-CM

## 2024-11-18 DIAGNOSIS — R53.83 OTHER FATIGUE: ICD-10-CM

## 2024-11-18 DIAGNOSIS — Z13.89 ENCOUNTER FOR SCREENING FOR OTHER DISORDER: ICD-10-CM

## 2024-11-18 DIAGNOSIS — R63.5 WEIGHT GAIN: ICD-10-CM

## 2024-11-18 DIAGNOSIS — Z13.21 ENCOUNTER FOR VITAMIN DEFICIENCY SCREENING: ICD-10-CM

## 2024-11-18 NOTE — TELEPHONE ENCOUNTER
Patient informed of results/recommendations.  Patient voiced that she would call back this afternoon and let us know if she wants to proceed with thyroid USN.

## 2024-11-18 NOTE — TELEPHONE ENCOUNTER
Please call patient with lab results:  Overall things look good but her T3 is a little high (normal TSH) - we can offer thyroid Usn if she would like to ensure no thyroid nodules/changes  Also her cholesterol panel is mildly abnormal but with diet exercise can likely be managed  I think also see PCP for her concerns, further eval, and possible need for endoscopy

## 2025-01-21 ENCOUNTER — OFFICE VISIT (OUTPATIENT)
Dept: OBGYN CLINIC | Age: 30
End: 2025-01-21

## 2025-01-21 ENCOUNTER — HOSPITAL ENCOUNTER (OUTPATIENT)
Age: 30
Setting detail: SPECIMEN
Discharge: HOME | End: 2025-01-21

## 2025-01-21 VITALS — WEIGHT: 144 LBS | SYSTOLIC BLOOD PRESSURE: 100 MMHG | DIASTOLIC BLOOD PRESSURE: 66 MMHG | BODY MASS INDEX: 25.51 KG/M2

## 2025-01-21 DIAGNOSIS — N89.8 VAGINAL DISCHARGE: ICD-10-CM

## 2025-01-21 DIAGNOSIS — N89.8 VAGINAL DISCHARGE: Primary | ICD-10-CM

## 2025-01-21 RX ORDER — ERGOCALCIFEROL 1.25 MG/1
50000 CAPSULE, LIQUID FILLED ORAL WEEKLY
COMMUNITY
Start: 2024-12-21

## 2025-01-21 SDOH — ECONOMIC STABILITY: FOOD INSECURITY: WITHIN THE PAST 12 MONTHS, THE FOOD YOU BOUGHT JUST DIDN'T LAST AND YOU DIDN'T HAVE MONEY TO GET MORE.: NEVER TRUE

## 2025-01-21 SDOH — ECONOMIC STABILITY: FOOD INSECURITY: WITHIN THE PAST 12 MONTHS, YOU WORRIED THAT YOUR FOOD WOULD RUN OUT BEFORE YOU GOT MONEY TO BUY MORE.: NEVER TRUE

## 2025-01-21 NOTE — PROGRESS NOTES
DATE OF VISIT:  1/21/25    PATIENT NAME:  Peyton Graham     YOB: 1995    REASON FOR VISIT:    Chief Complaint   Patient presents with    Vaginal Discharge     Patient reports vaginal odor and white creamy discharge.  Symptoms started 4-5 days ago.  Denies itching.       Chaperone for Intimate Exam  Chaperone was offered and accepted as part of the rooming process.  Chaperone: DARRELL Kuhn       HISTORY OF PRESENT ILLNESS:  Patient presents with concern for vaginal infection.  Reports vaginal discharge and odor x 4-5 days.  Denies vaginal itching or vaginal bleeding.  Denies any precipitating factors.  Has not changed any soaps, detergents, etc.  Denies concern for STIs.  Reports that she seems to get infection every couple of months or so.          No LMP recorded (lmp unknown). Patient has had a hysterectomy.  Vitals:    01/21/25 0931   BP: 100/66   Site: Right Upper Arm   Position: Sitting   Weight: 65.3 kg (144 lb)     Body mass index is 25.51 kg/m².  No Known Allergies  Current Outpatient Medications   Medication Sig Dispense Refill    vitamin D (ERGOCALCIFEROL) 1.25 MG (50319 UT) CAPS capsule Take 1 capsule by mouth once a week      metoprolol succinate (TOPROL XL) 25 MG extended release tablet Take 1 tablet by mouth daily       No current facility-administered medications for this visit.     Social History     Socioeconomic History    Marital status: Single   Tobacco Use    Smoking status: Never    Smokeless tobacco: Never   Vaping Use    Vaping status: Never Used   Substance and Sexual Activity    Alcohol use: Yes     Alcohol/week: 12.0 standard drinks of alcohol     Types: 12 Cans of beer per week     Comment: social, wine    Drug use: No    Sexual activity: Yes     Partners: Male     Social Determinants of Health     Financial Resource Strain: Low Risk  (11/14/2024)    Overall Financial Resource Strain (CARDIA)     Difficulty of Paying Living Expenses: Not hard at all   Food Insecurity: No

## 2025-01-22 LAB
CANDIDA SPECIES: NEGATIVE
GARDNERELLA VAGINALIS: POSITIVE
SOURCE: ABNORMAL
TRICHOMONAS: NEGATIVE

## 2025-01-22 RX ORDER — METRONIDAZOLE 500 MG/1
500 TABLET ORAL 2 TIMES DAILY
Qty: 14 TABLET | Refills: 0 | Status: SHIPPED | OUTPATIENT
Start: 2025-01-22 | End: 2025-01-29

## (undated) DEVICE — MASTISOL ADHESIVE LIQ 2/3ML

## (undated) DEVICE — DRESSING HEMSTAT W1X2IN ABSRB SURGCEL SNOW

## (undated) DEVICE — SUTURE MCRYL SZ 4-0 L27IN ABSRB UD L19MM PS-2 1/2 CIR PRIM Y426H

## (undated) DEVICE — TOTAL TRAY, DB, 100% SILI FOLEY, 16FR 10: Brand: MEDLINE

## (undated) DEVICE — SPONGE LAP W18XL18IN WHT COT 4 PLY FLD STRUNG RADPQ DISP ST

## (undated) DEVICE — COVER LT HNDL BLU PLAS

## (undated) DEVICE — ELECTRODE ES AD CRD L15FT DISP FOR PT BELOW 30LB REM

## (undated) DEVICE — Device

## (undated) DEVICE — STRIP,CLOSURE,WOUND,MEDI-STRIP,1/2X4: Brand: MEDLINE

## (undated) DEVICE — GOWN,SIRUS,POLYRNF,BRTHSLV,XL,30/CS: Brand: MEDLINE

## (undated) DEVICE — TIP COVER ACCESSORY

## (undated) DEVICE — ELECTRO LUBE IS A SINGLE PATIENT USE DEVICE THAT IS INTENDED TO BE USED ON ELECTROSURGICAL ELECTRODES TO REDUCE STICKING.: Brand: KEY SURGICAL ELECTRO LUBE

## (undated) DEVICE — GLOVE SURG SZ 65 L12IN FNGR THK87MIL WHT LTX FREE

## (undated) DEVICE — LAVH-LF: Brand: MEDLINE INDUSTRIES, INC.

## (undated) DEVICE — DRESSING SURESITE-123PLUSPAD 2.4 IN X2.8 IN

## (undated) DEVICE — MATERNITY KNIT PANTS,SEAMLESS: Brand: WINGS

## (undated) DEVICE — CANNULA SEAL

## (undated) DEVICE — DRAPE,UTILTY,TAPE,15X26, 4EA/PK: Brand: MEDLINE

## (undated) DEVICE — SOLUTION SURG PREP ANTIMICROBIAL 4 OZ SKIN WND EXIDINE

## (undated) DEVICE — GOWN,SIRUS,POLYRNF,BRTHSLV,LG,30/CS: Brand: MEDLINE

## (undated) DEVICE — 40586 ADVANCED TRENDELENBURG POSITIONING KIT: Brand: 40586 ADVANCED TRENDELENBURG POSITIONING KIT

## (undated) DEVICE — SUTURE DEV SZ 2-0 WND CLSR ABSRB GS-22 VLOC COVIDIEN VLOCM2145

## (undated) DEVICE — WARMER SCP 2 ANTIFOG LAP DISP

## (undated) DEVICE — APPLICATOR MEDICATED 26 CC SOLUTION HI LT ORNG CHLORAPREP

## (undated) DEVICE — 20 ML SYRINGE LUER-LOCK TIP: Brand: MONOJECT

## (undated) DEVICE — [HIGH FLOW INSUFFLATOR,  DO NOT USE IF PACKAGE IS DAMAGED,  KEEP DRY,  KEEP AWAY FROM SUNLIGHT,  PROTECT FROM HEAT AND RADIOACTIVE SOURCES.]: Brand: PNEUMOSURE

## (undated) DEVICE — Device: Brand: UTERINE ELEVATOR PRO

## (undated) DEVICE — SYRINGE MED 10ML LUERLOCK TIP W/O SFTY DISP

## (undated) DEVICE — SOLUTION IV 1000ML 0.9% SOD CHL PH 5 INJ USP VIAFLX PLAS

## (undated) DEVICE — BLADELESS OBTURATOR: Brand: WECK VISTA

## (undated) DEVICE — COVER,MAYO STAND,STERILE: Brand: MEDLINE

## (undated) DEVICE — INSUFFLATION NEEDLE TO ESTABLISH PNEUMOPERITONEUM.: Brand: INSUFFLATION NEEDLE

## (undated) DEVICE — SOLUTION IV IRRIG POUR BRL 0.9% SODIUM CHL 2F7124

## (undated) DEVICE — Z DISCONTINUED APPLICATOR SURG PREP 0.35OZ 2% CHG 70% ISO ALC W/ HI LT

## (undated) DEVICE — PREMIUM DRY TRAY LF: Brand: MEDLINE INDUSTRIES, INC.

## (undated) DEVICE — ARM DRAPE